# Patient Record
Sex: MALE | Race: WHITE | Employment: FULL TIME | URBAN - NONMETROPOLITAN AREA
[De-identification: names, ages, dates, MRNs, and addresses within clinical notes are randomized per-mention and may not be internally consistent; named-entity substitution may affect disease eponyms.]

---

## 2021-01-01 ENCOUNTER — APPOINTMENT (OUTPATIENT)
Dept: GENERAL RADIOLOGY | Age: 51
DRG: 870 | End: 2021-01-01
Attending: INTERNAL MEDICINE
Payer: COMMERCIAL

## 2021-01-01 ENCOUNTER — APPOINTMENT (OUTPATIENT)
Dept: CARDIAC CATH/INVASIVE PROCEDURES | Age: 51
DRG: 870 | End: 2021-01-01
Attending: INTERNAL MEDICINE
Payer: COMMERCIAL

## 2021-01-01 ENCOUNTER — HOSPITAL ENCOUNTER (INPATIENT)
Age: 51
LOS: 13 days | DRG: 870 | End: 2022-01-03
Attending: INTERNAL MEDICINE | Admitting: PEDIATRICS
Payer: COMMERCIAL

## 2021-01-01 LAB
ACINETOBACTER CALCOACETICUS-BAUMANNII BY PCR: NOT DETECTED
ADENOVIRUS BY PCR: NOT DETECTED
ALBUMIN SERPL-MCNC: 3.2 G/DL (ref 3.5–5.1)
ALLEN TEST: ABNORMAL
ALP BLD-CCNC: 78 U/L (ref 38–126)
ALT SERPL-CCNC: 65 U/L (ref 11–66)
ANION GAP SERPL CALCULATED.3IONS-SCNC: 10 MEQ/L (ref 8–16)
ANION GAP SERPL CALCULATED.3IONS-SCNC: 10 MEQ/L (ref 8–16)
ANION GAP SERPL CALCULATED.3IONS-SCNC: 13 MEQ/L (ref 8–16)
ANION GAP SERPL CALCULATED.3IONS-SCNC: 15 MEQ/L (ref 8–16)
ANION GAP SERPL CALCULATED.3IONS-SCNC: 15 MEQ/L (ref 8–16)
ANION GAP SERPL CALCULATED.3IONS-SCNC: 16 MEQ/L (ref 8–16)
ANION GAP SERPL CALCULATED.3IONS-SCNC: 9 MEQ/L (ref 8–16)
APTT: 72.8 SECONDS (ref 22–38)
APTT: 88.5 SECONDS (ref 22–38)
AST SERPL-CCNC: 73 U/L (ref 5–40)
AVERAGE GLUCOSE: 114 MG/DL (ref 70–126)
BACTERIA: ABNORMAL /HPF
BASE EXCESS (CALCULATED): -0.1 MMOL/L (ref -2.5–2.5)
BASE EXCESS (CALCULATED): -1 MMOL/L (ref -2.5–2.5)
BASE EXCESS (CALCULATED): 0.8 MMOL/L (ref -2.5–2.5)
BASE EXCESS MIXED: -3.6 MMOL/L (ref -2–3)
BASOPHILS # BLD: 0.1 %
BASOPHILS ABSOLUTE: 0 THOU/MM3 (ref 0–0.1)
BILIRUB SERPL-MCNC: 1.1 MG/DL (ref 0.3–1.2)
BILIRUBIN URINE: NEGATIVE
BLOOD, URINE: ABNORMAL
BUN BLDV-MCNC: 18 MG/DL (ref 7–22)
BUN BLDV-MCNC: 20 MG/DL (ref 7–22)
BUN BLDV-MCNC: 20 MG/DL (ref 7–22)
BUN BLDV-MCNC: 21 MG/DL (ref 7–22)
BUN BLDV-MCNC: 22 MG/DL (ref 7–22)
BUN BLDV-MCNC: 22 MG/DL (ref 7–22)
BUN BLDV-MCNC: 23 MG/DL (ref 7–22)
BUN BLDV-MCNC: 23 MG/DL (ref 7–22)
BUN BLDV-MCNC: 26 MG/DL (ref 7–22)
BUN BLDV-MCNC: 29 MG/DL (ref 7–22)
BUN BLDV-MCNC: 32 MG/DL (ref 7–22)
BUN BLDV-MCNC: 33 MG/DL (ref 7–22)
C-REACTIVE PROTEIN: 0.88 MG/DL (ref 0–1)
C-REACTIVE PROTEIN: 1.59 MG/DL (ref 0–1)
C-REACTIVE PROTEIN: 6.63 MG/DL (ref 0–1)
CALCIUM SERPL-MCNC: 7.9 MG/DL (ref 8.5–10.5)
CALCIUM SERPL-MCNC: 8 MG/DL (ref 8.5–10.5)
CALCIUM SERPL-MCNC: 8.1 MG/DL (ref 8.5–10.5)
CALCIUM SERPL-MCNC: 8.2 MG/DL (ref 8.5–10.5)
CALCIUM SERPL-MCNC: 8.3 MG/DL (ref 8.5–10.5)
CALCIUM SERPL-MCNC: 8.3 MG/DL (ref 8.5–10.5)
CALCIUM SERPL-MCNC: 8.5 MG/DL (ref 8.5–10.5)
CALCIUM SERPL-MCNC: 8.8 MG/DL (ref 8.5–10.5)
CASTS 2: ABNORMAL /LPF
CASTS UA: ABNORMAL /LPF
CHARACTER, URINE: ABNORMAL
CHLAMYDIA PNEUMONIAE BY PCR: NOT DETECTED
CHLORIDE BLD-SCNC: 100 MEQ/L (ref 98–111)
CHLORIDE BLD-SCNC: 101 MEQ/L (ref 98–111)
CHLORIDE BLD-SCNC: 101 MEQ/L (ref 98–111)
CHLORIDE BLD-SCNC: 102 MEQ/L (ref 98–111)
CHLORIDE BLD-SCNC: 103 MEQ/L (ref 98–111)
CHLORIDE BLD-SCNC: 95 MEQ/L (ref 98–111)
CHLORIDE BLD-SCNC: 98 MEQ/L (ref 98–111)
CHLORIDE BLD-SCNC: 98 MEQ/L (ref 98–111)
CHLORIDE BLD-SCNC: 99 MEQ/L (ref 98–111)
CHOLESTEROL, TOTAL: 74 MG/DL (ref 100–199)
CO2: 19 MEQ/L (ref 23–33)
CO2: 20 MEQ/L (ref 23–33)
CO2: 21 MEQ/L (ref 23–33)
CO2: 22 MEQ/L (ref 23–33)
CO2: 22 MEQ/L (ref 23–33)
CO2: 25 MEQ/L (ref 23–33)
CO2: 26 MEQ/L (ref 23–33)
COLLECTED BY:: ABNORMAL
COLOR: YELLOW
CORTISOL: 21.36 UG/DL
CREAT SERPL-MCNC: 0.5 MG/DL (ref 0.4–1.2)
CREAT SERPL-MCNC: 0.6 MG/DL (ref 0.4–1.2)
CREAT SERPL-MCNC: 0.7 MG/DL (ref 0.4–1.2)
CREAT SERPL-MCNC: 0.8 MG/DL (ref 0.4–1.2)
CREAT SERPL-MCNC: 0.8 MG/DL (ref 0.4–1.2)
CRYSTALS, UA: ABNORMAL
D-DIMER QUANTITATIVE: 535 NG/ML FEU (ref 0–500)
DEVICE: ABNORMAL
EKG ATRIAL RATE: 55 BPM
EKG ATRIAL RATE: 58 BPM
EKG ATRIAL RATE: 72 BPM
EKG P AXIS: 58 DEGREES
EKG P AXIS: 62 DEGREES
EKG P AXIS: 67 DEGREES
EKG P-R INTERVAL: 138 MS
EKG P-R INTERVAL: 144 MS
EKG P-R INTERVAL: 148 MS
EKG Q-T INTERVAL: 416 MS
EKG Q-T INTERVAL: 446 MS
EKG Q-T INTERVAL: 500 MS
EKG QRS DURATION: 118 MS
EKG QRS DURATION: 120 MS
EKG QRS DURATION: 126 MS
EKG QTC CALCULATION (BAZETT): 426 MS
EKG QTC CALCULATION (BAZETT): 455 MS
EKG QTC CALCULATION (BAZETT): 490 MS
EKG R AXIS: -5 DEGREES
EKG R AXIS: 17 DEGREES
EKG R AXIS: 51 DEGREES
EKG T AXIS: 63 DEGREES
EKG T AXIS: 66 DEGREES
EKG T AXIS: 73 DEGREES
EKG VENTRICULAR RATE: 55 BPM
EKG VENTRICULAR RATE: 58 BPM
EKG VENTRICULAR RATE: 72 BPM
ENTEROBACTER CLOACAE COMPLEX BY PCR: NOT DETECTED
EOSINOPHIL # BLD: 0 %
EOSINOPHIL # BLD: 0.1 %
EOSINOPHIL # BLD: 0.1 %
EOSINOPHILS ABSOLUTE: 0 THOU/MM3 (ref 0–0.4)
EPITHELIAL CELLS, UA: ABNORMAL /HPF
ERYTHROCYTE [DISTWIDTH] IN BLOOD BY AUTOMATED COUNT: 11.1 % (ref 11.5–14.5)
ERYTHROCYTE [DISTWIDTH] IN BLOOD BY AUTOMATED COUNT: 11.4 % (ref 11.5–14.5)
ERYTHROCYTE [DISTWIDTH] IN BLOOD BY AUTOMATED COUNT: 11.5 % (ref 11.5–14.5)
ERYTHROCYTE [DISTWIDTH] IN BLOOD BY AUTOMATED COUNT: 11.5 % (ref 11.5–14.5)
ERYTHROCYTE [DISTWIDTH] IN BLOOD BY AUTOMATED COUNT: 11.6 % (ref 11.5–14.5)
ERYTHROCYTE [DISTWIDTH] IN BLOOD BY AUTOMATED COUNT: 37.5 FL (ref 35–45)
ERYTHROCYTE [DISTWIDTH] IN BLOOD BY AUTOMATED COUNT: 37.7 FL (ref 35–45)
ERYTHROCYTE [DISTWIDTH] IN BLOOD BY AUTOMATED COUNT: 38.4 FL (ref 35–45)
ERYTHROCYTE [DISTWIDTH] IN BLOOD BY AUTOMATED COUNT: 38.5 FL (ref 35–45)
ERYTHROCYTE [DISTWIDTH] IN BLOOD BY AUTOMATED COUNT: 38.5 FL (ref 35–45)
ERYTHROCYTE [DISTWIDTH] IN BLOOD BY AUTOMATED COUNT: 38.7 FL (ref 35–45)
ERYTHROCYTE [DISTWIDTH] IN BLOOD BY AUTOMATED COUNT: 38.7 FL (ref 35–45)
ERYTHROCYTE [DISTWIDTH] IN BLOOD BY AUTOMATED COUNT: 39.1 FL (ref 35–45)
ESCHERICHIA COLI BY PCR: NOT DETECTED
FERRITIN: 2420 NG/ML (ref 22–322)
FERRITIN: 2564 NG/ML (ref 22–322)
FIO2, MIXED VENOUS: 100
GFR SERPL CREATININE-BSD FRML MDRD: > 90 ML/MIN/1.73M2
GLUCOSE BLD-MCNC: 106 MG/DL (ref 70–108)
GLUCOSE BLD-MCNC: 111 MG/DL (ref 70–108)
GLUCOSE BLD-MCNC: 116 MG/DL (ref 70–108)
GLUCOSE BLD-MCNC: 119 MG/DL (ref 70–108)
GLUCOSE BLD-MCNC: 120 MG/DL (ref 70–108)
GLUCOSE BLD-MCNC: 123 MG/DL (ref 70–108)
GLUCOSE BLD-MCNC: 128 MG/DL (ref 70–108)
GLUCOSE BLD-MCNC: 129 MG/DL (ref 70–108)
GLUCOSE BLD-MCNC: 131 MG/DL (ref 70–108)
GLUCOSE BLD-MCNC: 134 MG/DL (ref 70–108)
GLUCOSE BLD-MCNC: 135 MG/DL (ref 70–108)
GLUCOSE BLD-MCNC: 138 MG/DL (ref 70–108)
GLUCOSE BLD-MCNC: 142 MG/DL (ref 70–108)
GLUCOSE BLD-MCNC: 144 MG/DL (ref 70–108)
GLUCOSE BLD-MCNC: 146 MG/DL (ref 70–108)
GLUCOSE BLD-MCNC: 153 MG/DL (ref 70–108)
GLUCOSE BLD-MCNC: 157 MG/DL (ref 70–108)
GLUCOSE BLD-MCNC: 166 MG/DL (ref 70–108)
GLUCOSE BLD-MCNC: 181 MG/DL (ref 70–108)
GLUCOSE BLD-MCNC: 90 MG/DL (ref 70–108)
GLUCOSE URINE: NEGATIVE MG/DL
HAEMOPHILUS INFLUENZAE BY PCR: NOT DETECTED
HBA1C MFR BLD: 5.8 % (ref 4.4–6.4)
HCO3, MIXED: 29 MMOL/L (ref 23–28)
HCO3: 25 MMOL/L (ref 23–28)
HCO3: 26 MMOL/L (ref 23–28)
HCO3: 28 MMOL/L (ref 23–28)
HCT VFR BLD CALC: 40.5 % (ref 42–52)
HCT VFR BLD CALC: 42.2 % (ref 42–52)
HCT VFR BLD CALC: 42.7 % (ref 42–52)
HCT VFR BLD CALC: 44.2 % (ref 42–52)
HCT VFR BLD CALC: 44.3 % (ref 42–52)
HCT VFR BLD CALC: 44.7 % (ref 42–52)
HCT VFR BLD CALC: 45.3 % (ref 42–52)
HCT VFR BLD CALC: 45.9 % (ref 42–52)
HDLC SERPL-MCNC: 39 MG/DL
HEMOGLOBIN: 13.8 GM/DL (ref 14–18)
HEMOGLOBIN: 14.7 GM/DL (ref 14–18)
HEMOGLOBIN: 14.8 GM/DL (ref 14–18)
HEMOGLOBIN: 14.9 GM/DL (ref 14–18)
HEMOGLOBIN: 15.3 GM/DL (ref 14–18)
HEMOGLOBIN: 15.3 GM/DL (ref 14–18)
HEMOGLOBIN: 15.5 GM/DL (ref 14–18)
HEMOGLOBIN: 15.7 GM/DL (ref 14–18)
HEPARIN UNFRACTIONATED: 0.56 U/ML (ref 0.3–0.7)
HEPARIN UNFRACTIONATED: 0.61 U/ML (ref 0.3–0.7)
HEPARIN UNFRACTIONATED: 0.8 U/ML (ref 0.3–0.7)
IFIO2: 100
IMMATURE GRANS (ABS): 0.19 THOU/MM3 (ref 0–0.07)
IMMATURE GRANS (ABS): 0.26 THOU/MM3 (ref 0–0.07)
IMMATURE GRANS (ABS): 0.27 THOU/MM3 (ref 0–0.07)
IMMATURE GRANULOCYTES: 1.1 %
IMMATURE GRANULOCYTES: 1.3 %
IMMATURE GRANULOCYTES: 1.7 %
INFLUENZA A BY PCR: NOT DETECTED
INFLUENZA B BY PCR: NOT DETECTED
KETONES, URINE: NEGATIVE
KLEBSIELLA AEROGENES BY PCR: NOT DETECTED
KLEBSIELLA OXYTOCA BY PCR: NOT DETECTED
KLEBSIELLA PNEUMONIAE GROUP BY PCR: NOT DETECTED
LACTIC ACID, SEPSIS: 2.3 MMOL/L (ref 0.5–1.9)
LD: 340 U/L (ref 100–190)
LD: 347 U/L (ref 100–190)
LDL CHOLESTEROL CALCULATED: 21 MG/DL
LEGIONELLA PNEUMOPHILIA BY PCR: NOT DETECTED
LEUKOCYTE ESTERASE, URINE: NEGATIVE
LV EF: 65 %
LVEF MODALITY: NORMAL
LYMPHOCYTES # BLD: 2.2 %
LYMPHOCYTES # BLD: 2.8 %
LYMPHOCYTES # BLD: 3.2 %
LYMPHOCYTES ABSOLUTE: 0.5 THOU/MM3 (ref 1–4.8)
MAGNESIUM: 2.6 MG/DL (ref 1.6–2.4)
MCH RBC QN AUTO: 30.8 PG (ref 26–33)
MCH RBC QN AUTO: 31.2 PG (ref 26–33)
MCH RBC QN AUTO: 31.6 PG (ref 26–33)
MCH RBC QN AUTO: 31.7 PG (ref 26–33)
MCH RBC QN AUTO: 31.9 PG (ref 26–33)
MCH RBC QN AUTO: 32 PG (ref 26–33)
MCH RBC QN AUTO: 32 PG (ref 26–33)
MCH RBC QN AUTO: 32.1 PG (ref 26–33)
MCHC RBC AUTO-ENTMCNC: 33.4 GM/DL (ref 32.2–35.5)
MCHC RBC AUTO-ENTMCNC: 34.1 GM/DL (ref 32.2–35.5)
MCHC RBC AUTO-ENTMCNC: 34.2 GM/DL (ref 32.2–35.5)
MCHC RBC AUTO-ENTMCNC: 34.6 GM/DL (ref 32.2–35.5)
MCHC RBC AUTO-ENTMCNC: 34.8 GM/DL (ref 32.2–35.5)
MCHC RBC AUTO-ENTMCNC: 34.9 GM/DL (ref 32.2–35.5)
MCV RBC AUTO: 91 FL (ref 80–94)
MCV RBC AUTO: 91.6 FL (ref 80–94)
MCV RBC AUTO: 91.7 FL (ref 80–94)
MCV RBC AUTO: 92.1 FL (ref 80–94)
MCV RBC AUTO: 92.3 FL (ref 80–94)
MCV RBC AUTO: 92.3 FL (ref 80–94)
MCV RBC AUTO: 93.5 FL (ref 80–94)
MCV RBC AUTO: 93.5 FL (ref 80–94)
METAPNEUMOVIRUS BY PCR: NOT DETECTED
MISCELLANEOUS 2: ABNORMAL
MODE: ABNORMAL
MONOCYTES # BLD: 3.4 %
MONOCYTES # BLD: 5.1 %
MONOCYTES # BLD: 5.8 %
MONOCYTES ABSOLUTE: 0.7 THOU/MM3 (ref 0.4–1.3)
MONOCYTES ABSOLUTE: 0.8 THOU/MM3 (ref 0.4–1.3)
MONOCYTES ABSOLUTE: 1 THOU/MM3 (ref 0.4–1.3)
MORAXELLA CATARRHALIS BY PCR: NOT DETECTED
MYCOPLASMA PNEUMONIAE BY PCR: NOT DETECTED
NITRITE, URINE: NEGATIVE
NON-SARS CORONAVIRUS: NOT DETECTED
NUCLEATED RED BLOOD CELLS: 0 /100 WBC
O2 SAT, MIXED: 84 %
O2 SATURATION: 89 %
O2 SATURATION: 90 %
O2 SATURATION: 96 %
OSMOLALITY URINE: 1068 MOSMOL/KG (ref 250–750)
OSMOLALITY: 289 MOSMOL/KG (ref 275–295)
OSMOLALITY: 291 MOSMOL/KG (ref 275–295)
PARAINFLUENZA VIRUS BY PCR: NOT DETECTED
PCO2, MIXED VENOUS: 84 MMHG (ref 41–51)
PCO2: 47 MMHG (ref 35–45)
PCO2: 47 MMHG (ref 35–45)
PCO2: 56 MMHG (ref 35–45)
PH BLOOD GAS: 7.31 (ref 7.35–7.45)
PH BLOOD GAS: 7.34 (ref 7.35–7.45)
PH BLOOD GAS: 7.35 (ref 7.35–7.45)
PH UA: 5.5 (ref 5–9)
PH, MIXED: 7.14 (ref 7.31–7.41)
PIP: 36 CMH2O
PIP: 38 CMH2O
PLATELET # BLD: 259 THOU/MM3 (ref 130–400)
PLATELET # BLD: 264 THOU/MM3 (ref 130–400)
PLATELET # BLD: 296 THOU/MM3 (ref 130–400)
PLATELET # BLD: 339 THOU/MM3 (ref 130–400)
PLATELET # BLD: 347 THOU/MM3 (ref 130–400)
PLATELET # BLD: 348 THOU/MM3 (ref 130–400)
PLATELET # BLD: 380 THOU/MM3 (ref 130–400)
PLATELET # BLD: 385 THOU/MM3 (ref 130–400)
PMV BLD AUTO: 10 FL (ref 9.4–12.4)
PMV BLD AUTO: 10.1 FL (ref 9.4–12.4)
PMV BLD AUTO: 9.2 FL (ref 9.4–12.4)
PMV BLD AUTO: 9.3 FL (ref 9.4–12.4)
PMV BLD AUTO: 9.3 FL (ref 9.4–12.4)
PMV BLD AUTO: 9.5 FL (ref 9.4–12.4)
PMV BLD AUTO: 9.8 FL (ref 9.4–12.4)
PMV BLD AUTO: 9.9 FL (ref 9.4–12.4)
PO2 MIXED: 65 MMHG (ref 25–40)
PO2: 63 MMHG (ref 71–104)
PO2: 63 MMHG (ref 71–104)
PO2: 83 MMHG (ref 71–104)
POTASSIUM SERPL-SCNC: 4.8 MEQ/L (ref 3.5–5.2)
POTASSIUM SERPL-SCNC: 4.8 MEQ/L (ref 3.5–5.2)
POTASSIUM SERPL-SCNC: 4.9 MEQ/L (ref 3.5–5.2)
POTASSIUM SERPL-SCNC: 5 MEQ/L (ref 3.5–5.2)
POTASSIUM SERPL-SCNC: 5.3 MEQ/L (ref 3.5–5.2)
POTASSIUM SERPL-SCNC: 5.3 MEQ/L (ref 3.5–5.2)
POTASSIUM SERPL-SCNC: 5.4 MEQ/L (ref 3.5–5.2)
POTASSIUM SERPL-SCNC: 5.4 MEQ/L (ref 3.5–5.2)
PRO-BNP: 135.7 PG/ML (ref 0–900)
PROCALCITONIN: 0.07 NG/ML (ref 0.01–0.09)
PROCALCITONIN: 0.2 NG/ML (ref 0.01–0.09)
PROTEIN UA: 30
PROTEUS SPECIES BY PCR: NOT DETECTED
PSEUDOMONAS AERUGINOSA BY PCR: NOT DETECTED
RBC # BLD: 4.33 MILL/MM3 (ref 4.7–6.1)
RBC # BLD: 4.58 MILL/MM3 (ref 4.7–6.1)
RBC # BLD: 4.66 MILL/MM3 (ref 4.7–6.1)
RBC # BLD: 4.8 MILL/MM3 (ref 4.7–6.1)
RBC # BLD: 4.82 MILL/MM3 (ref 4.7–6.1)
RBC # BLD: 4.91 MILL/MM3 (ref 4.7–6.1)
RBC URINE: ABNORMAL /HPF
RENAL EPITHELIAL, UA: ABNORMAL
RESISTANT GENE CTX-M BY PCR: ABNORMAL
RESISTANT GENE IMP BY PCR: ABNORMAL
RESISTANT GENE KPC BY PCR: ABNORMAL
RESISTANT GENE MECA/C & MREJ BY PCR: NOT DETECTED
RESISTANT GENE NDM BY PCR: ABNORMAL
RESISTANT GENE OXA-48-LIKE BY PCR: ABNORMAL
RESISTANT GENE VIM BY PCR: ABNORMAL
RESPIRATORY SYNCYTIAL VIRUS BY PCR: NOT DETECTED
RHINOVIRUS ENTEROVIRUS PCR: NOT DETECTED
SARS-COV-2: DETECTED
SEG NEUTROPHILS: 89.8 %
SEG NEUTROPHILS: 90.1 %
SEG NEUTROPHILS: 93 %
SEGMENTED NEUTROPHILS ABSOLUTE COUNT: 13.9 THOU/MM3 (ref 1.8–7.7)
SEGMENTED NEUTROPHILS ABSOLUTE COUNT: 15.9 THOU/MM3 (ref 1.8–7.7)
SEGMENTED NEUTROPHILS ABSOLUTE COUNT: 19.3 THOU/MM3 (ref 1.8–7.7)
SERRATIA MARCESCENS BY PCR: NOT DETECTED
SET PEEP: 16 MMHG
SET PEEP: 18 MMHG
SET PRESS SUPP: 20 CMH2O
SET PRESS SUPP: 22 CMH2O
SET RESPIRATORY RATE: 20 BPM
SET RESPIRATORY RATE: 30 BPM
SITE: ABNORMAL
SODIUM BLD-SCNC: 130 MEQ/L (ref 135–145)
SODIUM BLD-SCNC: 131 MEQ/L (ref 135–145)
SODIUM BLD-SCNC: 132 MEQ/L (ref 135–145)
SODIUM BLD-SCNC: 133 MEQ/L (ref 135–145)
SODIUM BLD-SCNC: 134 MEQ/L (ref 135–145)
SODIUM BLD-SCNC: 136 MEQ/L (ref 135–145)
SODIUM BLD-SCNC: 138 MEQ/L (ref 135–145)
SODIUM URINE: 35 MEQ/L
SOURCE, BLOOD GAS: ABNORMAL
SOURCE: ABNORMAL
SPECIFIC GRAVITY, URINE: > 1.03 (ref 1–1.03)
SPECIMEN ACCEPTABILITY: ABNORMAL
STAPH AUREUS BY PCR: DETECTED
STREP AGALACTIAE BY PCR: NOT DETECTED
STREP PNEUMONIAE BY PCR: NOT DETECTED
STREP PYOGENES BY PCR: NOT DETECTED
TOTAL PROTEIN: 6.4 G/DL (ref 6.1–8)
TRIGL SERPL-MCNC: 245 MG/DL (ref 0–199)
TRIGL SERPL-MCNC: 71 MG/DL (ref 0–199)
TROPONIN T: 0.1 NG/ML
TROPONIN T: 0.29 NG/ML
TROPONIN T: 0.32 NG/ML
TSH SERPL DL<=0.05 MIU/L-ACNC: 0.65 UIU/ML (ref 0.4–4.2)
UROBILINOGEN, URINE: 1 EU/DL (ref 0–1)
WBC # BLD: 10.5 THOU/MM3 (ref 4.8–10.8)
WBC # BLD: 10.6 THOU/MM3 (ref 4.8–10.8)
WBC # BLD: 11.5 THOU/MM3 (ref 4.8–10.8)
WBC # BLD: 14.2 THOU/MM3 (ref 4.8–10.8)
WBC # BLD: 15.5 THOU/MM3 (ref 4.8–10.8)
WBC # BLD: 17.7 THOU/MM3 (ref 4.8–10.8)
WBC # BLD: 20.7 THOU/MM3 (ref 4.8–10.8)
WBC # BLD: 9.5 THOU/MM3 (ref 4.8–10.8)
WBC UA: ABNORMAL /HPF
YEAST: ABNORMAL

## 2021-01-01 PROCEDURE — 87798 DETECT AGENT NOS DNA AMP: CPT

## 2021-01-01 PROCEDURE — 94669 MECHANICAL CHEST WALL OSCILL: CPT

## 2021-01-01 PROCEDURE — 6360000002 HC RX W HCPCS: Performed by: NURSE PRACTITIONER

## 2021-01-01 PROCEDURE — 99232 SBSQ HOSP IP/OBS MODERATE 35: CPT | Performed by: NURSE PRACTITIONER

## 2021-01-01 PROCEDURE — 6370000000 HC RX 637 (ALT 250 FOR IP): Performed by: FAMILY MEDICINE

## 2021-01-01 PROCEDURE — 85025 COMPLETE CBC W/AUTO DIFF WBC: CPT

## 2021-01-01 PROCEDURE — 2140000000 HC CCU INTERMEDIATE R&B

## 2021-01-01 PROCEDURE — 6370000000 HC RX 637 (ALT 250 FOR IP): Performed by: INTERNAL MEDICINE

## 2021-01-01 PROCEDURE — 82803 BLOOD GASES ANY COMBINATION: CPT

## 2021-01-01 PROCEDURE — 71045 X-RAY EXAM CHEST 1 VIEW: CPT

## 2021-01-01 PROCEDURE — 87070 CULTURE OTHR SPECIMN AEROBIC: CPT

## 2021-01-01 PROCEDURE — 87581 M.PNEUMON DNA AMP PROBE: CPT

## 2021-01-01 PROCEDURE — 2700000000 HC OXYGEN THERAPY PER DAY

## 2021-01-01 PROCEDURE — 82948 REAGENT STRIP/BLOOD GLUCOSE: CPT

## 2021-01-01 PROCEDURE — 2580000003 HC RX 258: Performed by: NURSE PRACTITIONER

## 2021-01-01 PROCEDURE — 6360000002 HC RX W HCPCS: Performed by: FAMILY MEDICINE

## 2021-01-01 PROCEDURE — 6370000000 HC RX 637 (ALT 250 FOR IP): Performed by: PEDIATRICS

## 2021-01-01 PROCEDURE — 2580000003 HC RX 258: Performed by: INTERNAL MEDICINE

## 2021-01-01 PROCEDURE — 83605 ASSAY OF LACTIC ACID: CPT

## 2021-01-01 PROCEDURE — 99291 CRITICAL CARE FIRST HOUR: CPT | Performed by: INTERNAL MEDICINE

## 2021-01-01 PROCEDURE — 2580000003 HC RX 258: Performed by: FAMILY MEDICINE

## 2021-01-01 PROCEDURE — 83930 ASSAY OF BLOOD OSMOLALITY: CPT

## 2021-01-01 PROCEDURE — 6370000000 HC RX 637 (ALT 250 FOR IP): Performed by: NURSE PRACTITIONER

## 2021-01-01 PROCEDURE — 6360000002 HC RX W HCPCS

## 2021-01-01 PROCEDURE — 94660 CPAP INITIATION&MGMT: CPT

## 2021-01-01 PROCEDURE — 85027 COMPLETE CBC AUTOMATED: CPT

## 2021-01-01 PROCEDURE — 93010 ELECTROCARDIOGRAM REPORT: CPT | Performed by: INTERNAL MEDICINE

## 2021-01-01 PROCEDURE — 36592 COLLECT BLOOD FROM PICC: CPT

## 2021-01-01 PROCEDURE — 80048 BASIC METABOLIC PNL TOTAL CA: CPT

## 2021-01-01 PROCEDURE — APPNB60 APP NON BILLABLE TIME 46-60 MINS: Performed by: NURSE PRACTITIONER

## 2021-01-01 PROCEDURE — 2500000003 HC RX 250 WO HCPCS: Performed by: NURSE PRACTITIONER

## 2021-01-01 PROCEDURE — 94761 N-INVAS EAR/PLS OXIMETRY MLT: CPT

## 2021-01-01 PROCEDURE — 2100000000 HC CCU R&B

## 2021-01-01 PROCEDURE — 82728 ASSAY OF FERRITIN: CPT

## 2021-01-01 PROCEDURE — 84484 ASSAY OF TROPONIN QUANT: CPT

## 2021-01-01 PROCEDURE — 93005 ELECTROCARDIOGRAM TRACING: CPT | Performed by: NURSE PRACTITIONER

## 2021-01-01 PROCEDURE — 99232 SBSQ HOSP IP/OBS MODERATE 35: CPT | Performed by: FAMILY MEDICINE

## 2021-01-01 PROCEDURE — 93306 TTE W/DOPPLER COMPLETE: CPT

## 2021-01-01 PROCEDURE — 93005 ELECTROCARDIOGRAM TRACING: CPT | Performed by: PEDIATRICS

## 2021-01-01 PROCEDURE — 84478 ASSAY OF TRIGLYCERIDES: CPT

## 2021-01-01 PROCEDURE — 86140 C-REACTIVE PROTEIN: CPT

## 2021-01-01 PROCEDURE — 0BH18EZ INSERTION OF ENDOTRACHEAL AIRWAY INTO TRACHEA, VIA NATURAL OR ARTIFICIAL OPENING ENDOSCOPIC: ICD-10-PCS | Performed by: INTERNAL MEDICINE

## 2021-01-01 PROCEDURE — 85730 THROMBOPLASTIN TIME PARTIAL: CPT

## 2021-01-01 PROCEDURE — 81001 URINALYSIS AUTO W/SCOPE: CPT

## 2021-01-01 PROCEDURE — 87040 BLOOD CULTURE FOR BACTERIA: CPT

## 2021-01-01 PROCEDURE — 2500000003 HC RX 250 WO HCPCS: Performed by: INTERNAL MEDICINE

## 2021-01-01 PROCEDURE — 36600 WITHDRAWAL OF ARTERIAL BLOOD: CPT

## 2021-01-01 PROCEDURE — 83036 HEMOGLOBIN GLYCOSYLATED A1C: CPT

## 2021-01-01 PROCEDURE — 87205 SMEAR GRAM STAIN: CPT

## 2021-01-01 PROCEDURE — 99232 SBSQ HOSP IP/OBS MODERATE 35: CPT | Performed by: STUDENT IN AN ORGANIZED HEALTH CARE EDUCATION/TRAINING PROGRAM

## 2021-01-01 PROCEDURE — 36415 COLL VENOUS BLD VENIPUNCTURE: CPT

## 2021-01-01 PROCEDURE — 80061 LIPID PANEL: CPT

## 2021-01-01 PROCEDURE — 84145 PROCALCITONIN (PCT): CPT

## 2021-01-01 PROCEDURE — 87486 CHLMYD PNEUM DNA AMP PROBE: CPT

## 2021-01-01 PROCEDURE — 6360000002 HC RX W HCPCS: Performed by: INTERNAL MEDICINE

## 2021-01-01 PROCEDURE — 83880 ASSAY OF NATRIURETIC PEPTIDE: CPT

## 2021-01-01 PROCEDURE — 84300 ASSAY OF URINE SODIUM: CPT

## 2021-01-01 PROCEDURE — 87541 LEGION PNEUMO DNA AMP PROB: CPT

## 2021-01-01 PROCEDURE — 85379 FIBRIN DEGRADATION QUANT: CPT

## 2021-01-01 PROCEDURE — 87631 RESP VIRUS 3-5 TARGETS: CPT

## 2021-01-01 PROCEDURE — 99223 1ST HOSP IP/OBS HIGH 75: CPT | Performed by: PEDIATRICS

## 2021-01-01 PROCEDURE — 99233 SBSQ HOSP IP/OBS HIGH 50: CPT | Performed by: HOSPITALIST

## 2021-01-01 PROCEDURE — 83735 ASSAY OF MAGNESIUM: CPT

## 2021-01-01 PROCEDURE — 83935 ASSAY OF URINE OSMOLALITY: CPT

## 2021-01-01 PROCEDURE — 87186 SC STD MICRODIL/AGAR DIL: CPT

## 2021-01-01 PROCEDURE — 87150 DNA/RNA AMPLIFIED PROBE: CPT

## 2021-01-01 PROCEDURE — 85520 HEPARIN ASSAY: CPT

## 2021-01-01 PROCEDURE — 36556 INSERT NON-TUNNEL CV CATH: CPT

## 2021-01-01 PROCEDURE — 82533 TOTAL CORTISOL: CPT

## 2021-01-01 PROCEDURE — 83615 LACTATE (LD) (LDH) ENZYME: CPT

## 2021-01-01 PROCEDURE — 02HV33Z INSERTION OF INFUSION DEVICE INTO SUPERIOR VENA CAVA, PERCUTANEOUS APPROACH: ICD-10-PCS | Performed by: INTERNAL MEDICINE

## 2021-01-01 PROCEDURE — 94003 VENT MGMT INPAT SUBQ DAY: CPT

## 2021-01-01 PROCEDURE — 99233 SBSQ HOSP IP/OBS HIGH 50: CPT | Performed by: FAMILY MEDICINE

## 2021-01-01 PROCEDURE — 5A1955Z RESPIRATORY VENTILATION, GREATER THAN 96 CONSECUTIVE HOURS: ICD-10-PCS | Performed by: INTERNAL MEDICINE

## 2021-01-01 PROCEDURE — 80053 COMPREHEN METABOLIC PANEL: CPT

## 2021-01-01 PROCEDURE — 87147 CULTURE TYPE IMMUNOLOGIC: CPT

## 2021-01-01 PROCEDURE — 87077 CULTURE AEROBIC IDENTIFY: CPT

## 2021-01-01 PROCEDURE — 36556 INSERT NON-TUNNEL CV CATH: CPT | Performed by: NURSE PRACTITIONER

## 2021-01-01 PROCEDURE — 84443 ASSAY THYROID STIM HORMONE: CPT

## 2021-01-01 PROCEDURE — 94002 VENT MGMT INPAT INIT DAY: CPT

## 2021-01-01 PROCEDURE — 31500 INSERT EMERGENCY AIRWAY: CPT | Performed by: NURSE PRACTITIONER

## 2021-01-01 PROCEDURE — 99223 1ST HOSP IP/OBS HIGH 75: CPT | Performed by: INTERNAL MEDICINE

## 2021-01-01 RX ORDER — ACETAMINOPHEN 325 MG/1
650 TABLET ORAL EVERY 6 HOURS PRN
Status: DISCONTINUED | OUTPATIENT
Start: 2021-01-01 | End: 2022-01-04 | Stop reason: HOSPADM

## 2021-01-01 RX ORDER — LOSARTAN POTASSIUM 50 MG/1
50 TABLET ORAL NIGHTLY
COMMUNITY

## 2021-01-01 RX ORDER — NOREPINEPHRINE BIT/0.9 % NACL 16MG/250ML
2-100 INFUSION BOTTLE (ML) INTRAVENOUS CONTINUOUS
Status: DISCONTINUED | OUTPATIENT
Start: 2021-01-01 | End: 2022-01-04 | Stop reason: HOSPADM

## 2021-01-01 RX ORDER — ALBUTEROL SULFATE 90 UG/1
2 AEROSOL, METERED RESPIRATORY (INHALATION) EVERY 4 HOURS PRN
Status: DISCONTINUED | OUTPATIENT
Start: 2021-01-01 | End: 2022-01-04 | Stop reason: HOSPADM

## 2021-01-01 RX ORDER — SODIUM CHLORIDE 0.9 % (FLUSH) 0.9 %
5-40 SYRINGE (ML) INJECTION EVERY 12 HOURS SCHEDULED
Status: DISCONTINUED | OUTPATIENT
Start: 2021-01-01 | End: 2022-01-04 | Stop reason: HOSPADM

## 2021-01-01 RX ORDER — DEXTROSE MONOHYDRATE 25 G/50ML
12.5 INJECTION, SOLUTION INTRAVENOUS PRN
Status: DISCONTINUED | OUTPATIENT
Start: 2021-01-01 | End: 2022-01-04 | Stop reason: HOSPADM

## 2021-01-01 RX ORDER — ZINC SULFATE 50(220)MG
50 CAPSULE ORAL DAILY
Status: DISCONTINUED | OUTPATIENT
Start: 2021-01-01 | End: 2021-01-01

## 2021-01-01 RX ORDER — SODIUM CHLORIDE 9 MG/ML
25 INJECTION, SOLUTION INTRAVENOUS PRN
Status: DISCONTINUED | OUTPATIENT
Start: 2021-01-01 | End: 2022-01-04 | Stop reason: HOSPADM

## 2021-01-01 RX ORDER — ONDANSETRON 4 MG/1
4 TABLET, ORALLY DISINTEGRATING ORAL EVERY 8 HOURS PRN
Status: DISCONTINUED | OUTPATIENT
Start: 2021-01-01 | End: 2022-01-04 | Stop reason: HOSPADM

## 2021-01-01 RX ORDER — ONDANSETRON 2 MG/ML
4 INJECTION INTRAMUSCULAR; INTRAVENOUS EVERY 6 HOURS PRN
Status: DISCONTINUED | OUTPATIENT
Start: 2021-01-01 | End: 2021-01-01

## 2021-01-01 RX ORDER — HEPARIN SODIUM 10000 [USP'U]/100ML
12 INJECTION, SOLUTION INTRAVENOUS CONTINUOUS
Status: DISCONTINUED | OUTPATIENT
Start: 2021-01-01 | End: 2021-01-01 | Stop reason: SDUPTHER

## 2021-01-01 RX ORDER — ASCORBIC ACID 500 MG
500 TABLET ORAL DAILY
Status: DISCONTINUED | OUTPATIENT
Start: 2021-01-01 | End: 2022-01-04 | Stop reason: HOSPADM

## 2021-01-01 RX ORDER — AMLODIPINE BESYLATE 10 MG/1
10 TABLET ORAL DAILY
COMMUNITY

## 2021-01-01 RX ORDER — PROPOFOL 10 MG/ML
5-50 INJECTION, EMULSION INTRAVENOUS
Status: DISCONTINUED | OUTPATIENT
Start: 2021-01-01 | End: 2022-01-04 | Stop reason: HOSPADM

## 2021-01-01 RX ORDER — ONDANSETRON 4 MG/1
4 TABLET, ORALLY DISINTEGRATING ORAL EVERY 8 HOURS PRN
Status: DISCONTINUED | OUTPATIENT
Start: 2021-01-01 | End: 2021-01-01

## 2021-01-01 RX ORDER — ACETAMINOPHEN 650 MG/1
650 SUPPOSITORY RECTAL EVERY 6 HOURS PRN
Status: DISCONTINUED | OUTPATIENT
Start: 2021-01-01 | End: 2021-01-01

## 2021-01-01 RX ORDER — LORAZEPAM 2 MG/ML
1 INJECTION INTRAMUSCULAR EVERY 6 HOURS PRN
Status: DISCONTINUED | OUTPATIENT
Start: 2021-01-01 | End: 2021-01-01 | Stop reason: SDUPTHER

## 2021-01-01 RX ORDER — DEXAMETHASONE SODIUM PHOSPHATE 10 MG/ML
10 INJECTION, EMULSION INTRAMUSCULAR; INTRAVENOUS EVERY 24 HOURS
Status: COMPLETED | OUTPATIENT
Start: 2021-01-01 | End: 2021-01-01

## 2021-01-01 RX ORDER — FENTANYL CITRATE 50 UG/ML
25 INJECTION, SOLUTION INTRAMUSCULAR; INTRAVENOUS
Status: DISCONTINUED | OUTPATIENT
Start: 2021-01-01 | End: 2022-01-04 | Stop reason: HOSPADM

## 2021-01-01 RX ORDER — MIDAZOLAM HYDROCHLORIDE 1 MG/ML
4 INJECTION INTRAMUSCULAR; INTRAVENOUS ONCE
Status: DISCONTINUED | OUTPATIENT
Start: 2021-01-01 | End: 2022-01-04 | Stop reason: HOSPADM

## 2021-01-01 RX ORDER — METOPROLOL TARTRATE 5 MG/5ML
2.5 INJECTION INTRAVENOUS ONCE
Status: COMPLETED | OUTPATIENT
Start: 2021-01-01 | End: 2021-01-01

## 2021-01-01 RX ORDER — HEPARIN SODIUM 1000 [USP'U]/ML
4000 INJECTION, SOLUTION INTRAVENOUS; SUBCUTANEOUS PRN
Status: DISCONTINUED | OUTPATIENT
Start: 2021-01-01 | End: 2021-01-01 | Stop reason: ALTCHOICE

## 2021-01-01 RX ORDER — POLYVINYL ALCOHOL 14 MG/ML
1 SOLUTION/ DROPS OPHTHALMIC PRN
Status: DISCONTINUED | OUTPATIENT
Start: 2021-01-01 | End: 2022-01-04 | Stop reason: HOSPADM

## 2021-01-01 RX ORDER — SENNOSIDES 8.8 MG/5ML
5 LIQUID ORAL NIGHTLY
Status: DISCONTINUED | OUTPATIENT
Start: 2021-01-01 | End: 2022-01-04 | Stop reason: HOSPADM

## 2021-01-01 RX ORDER — LACTOBACILLUS RHAMNOSUS GG 10B CELL
1 CAPSULE ORAL
Status: DISCONTINUED | OUTPATIENT
Start: 2021-01-01 | End: 2022-01-04 | Stop reason: HOSPADM

## 2021-01-01 RX ORDER — ACETAMINOPHEN 325 MG/1
650 TABLET ORAL EVERY 6 HOURS PRN
Status: DISCONTINUED | OUTPATIENT
Start: 2021-01-01 | End: 2021-01-01

## 2021-01-01 RX ORDER — SODIUM CHLORIDE 9 MG/ML
25 INJECTION, SOLUTION INTRAVENOUS PRN
Status: DISCONTINUED | OUTPATIENT
Start: 2021-01-01 | End: 2021-01-01

## 2021-01-01 RX ORDER — LEVOTHYROXINE SODIUM 0.05 MG/1
50 TABLET ORAL DAILY
Status: DISCONTINUED | OUTPATIENT
Start: 2021-01-01 | End: 2021-01-01

## 2021-01-01 RX ORDER — VITAMIN B COMPLEX
2000 TABLET ORAL DAILY
Status: DISCONTINUED | OUTPATIENT
Start: 2021-01-01 | End: 2021-01-01

## 2021-01-01 RX ORDER — SODIUM CHLORIDE 0.9 % (FLUSH) 0.9 %
5-40 SYRINGE (ML) INJECTION EVERY 12 HOURS SCHEDULED
Status: DISCONTINUED | OUTPATIENT
Start: 2021-01-01 | End: 2021-01-01

## 2021-01-01 RX ORDER — ASPIRIN 81 MG/1
81 TABLET, CHEWABLE ORAL DAILY
Status: DISCONTINUED | OUTPATIENT
Start: 2021-01-01 | End: 2021-01-01

## 2021-01-01 RX ORDER — MIDAZOLAM IN NACL,ISO-OSMOT/PF 50 MG/50ML
1-10 INFUSION BOTTLE (ML) INTRAVENOUS CONTINUOUS
Status: DISCONTINUED | OUTPATIENT
Start: 2021-01-01 | End: 2022-01-04 | Stop reason: HOSPADM

## 2021-01-01 RX ORDER — POLYETHYLENE GLYCOL 3350 17 G/17G
17 POWDER, FOR SOLUTION ORAL DAILY
Status: DISCONTINUED | OUTPATIENT
Start: 2021-01-01 | End: 2022-01-04 | Stop reason: HOSPADM

## 2021-01-01 RX ORDER — CLOPIDOGREL BISULFATE 75 MG/1
75 TABLET ORAL DAILY
Status: DISCONTINUED | OUTPATIENT
Start: 2021-01-01 | End: 2021-01-01

## 2021-01-01 RX ORDER — ASCORBIC ACID 500 MG
1000 TABLET ORAL DAILY
Status: DISCONTINUED | OUTPATIENT
Start: 2021-01-01 | End: 2021-01-01

## 2021-01-01 RX ORDER — POLYETHYLENE GLYCOL 3350 17 G/17G
17 POWDER, FOR SOLUTION ORAL DAILY PRN
Status: DISCONTINUED | OUTPATIENT
Start: 2021-01-01 | End: 2021-01-01

## 2021-01-01 RX ORDER — FENTANYL CITRATE 50 UG/ML
50 INJECTION, SOLUTION INTRAMUSCULAR; INTRAVENOUS ONCE
Status: COMPLETED | OUTPATIENT
Start: 2021-01-01 | End: 2021-01-01

## 2021-01-01 RX ORDER — HEPARIN SODIUM 1000 [USP'U]/ML
60 INJECTION, SOLUTION INTRAVENOUS; SUBCUTANEOUS ONCE
Status: DISCONTINUED | OUTPATIENT
Start: 2021-01-01 | End: 2021-01-01 | Stop reason: ALTCHOICE

## 2021-01-01 RX ORDER — SODIUM CHLORIDE 0.9 % (FLUSH) 0.9 %
5-40 SYRINGE (ML) INJECTION PRN
Status: DISCONTINUED | OUTPATIENT
Start: 2021-01-01 | End: 2022-01-04 | Stop reason: HOSPADM

## 2021-01-01 RX ORDER — ASPIRIN 81 MG/1
81 TABLET, CHEWABLE ORAL DAILY
Status: DISCONTINUED | OUTPATIENT
Start: 2021-01-01 | End: 2022-01-04 | Stop reason: HOSPADM

## 2021-01-01 RX ORDER — LEVOTHYROXINE SODIUM 0.05 MG/1
50 TABLET ORAL DAILY
Status: DISCONTINUED | OUTPATIENT
Start: 2021-01-01 | End: 2022-01-04 | Stop reason: HOSPADM

## 2021-01-01 RX ORDER — ATORVASTATIN CALCIUM 40 MG/1
40 TABLET, FILM COATED ORAL NIGHTLY
Status: DISCONTINUED | OUTPATIENT
Start: 2021-01-01 | End: 2021-01-01

## 2021-01-01 RX ORDER — ZINC SULFATE 50(220)MG
50 CAPSULE ORAL DAILY
Status: DISCONTINUED | OUTPATIENT
Start: 2021-01-01 | End: 2022-01-04 | Stop reason: HOSPADM

## 2021-01-01 RX ORDER — CHLORHEXIDINE GLUCONATE 0.12 MG/ML
15 RINSE ORAL 2 TIMES DAILY
Status: DISCONTINUED | OUTPATIENT
Start: 2021-01-01 | End: 2022-01-04 | Stop reason: HOSPADM

## 2021-01-01 RX ORDER — FENTANYL CITRATE 50 UG/ML
INJECTION, SOLUTION INTRAMUSCULAR; INTRAVENOUS
Status: COMPLETED
Start: 2021-01-01 | End: 2021-01-01

## 2021-01-01 RX ORDER — ONDANSETRON 2 MG/ML
4 INJECTION INTRAMUSCULAR; INTRAVENOUS EVERY 6 HOURS PRN
Status: DISCONTINUED | OUTPATIENT
Start: 2021-01-01 | End: 2022-01-04 | Stop reason: HOSPADM

## 2021-01-01 RX ORDER — LORAZEPAM 2 MG/ML
0.5 INJECTION INTRAMUSCULAR EVERY 6 HOURS PRN
Status: DISCONTINUED | OUTPATIENT
Start: 2021-01-01 | End: 2021-01-01

## 2021-01-01 RX ORDER — METOPROLOL TARTRATE 5 MG/5ML
2.5 INJECTION INTRAVENOUS ONCE
Status: COMPLETED | OUTPATIENT
Start: 2022-01-01 | End: 2022-01-01

## 2021-01-01 RX ORDER — LEVOTHYROXINE SODIUM 0.1 MG/1
100 TABLET ORAL DAILY
COMMUNITY

## 2021-01-01 RX ORDER — HEPARIN SODIUM 1000 [USP'U]/ML
2000 INJECTION, SOLUTION INTRAVENOUS; SUBCUTANEOUS PRN
Status: DISCONTINUED | OUTPATIENT
Start: 2021-01-01 | End: 2021-01-01 | Stop reason: ALTCHOICE

## 2021-01-01 RX ORDER — HEPARIN SODIUM 10000 [USP'U]/100ML
5-30 INJECTION, SOLUTION INTRAVENOUS CONTINUOUS
Status: DISCONTINUED | OUTPATIENT
Start: 2021-01-01 | End: 2021-01-01 | Stop reason: ALTCHOICE

## 2021-01-01 RX ORDER — NITROGLYCERIN 20 MG/100ML
0-200 INJECTION INTRAVENOUS CONTINUOUS
Status: DISCONTINUED | OUTPATIENT
Start: 2021-01-01 | End: 2021-01-01

## 2021-01-01 RX ORDER — NICOTINE POLACRILEX 4 MG
15 LOZENGE BUCCAL PRN
Status: DISCONTINUED | OUTPATIENT
Start: 2021-01-01 | End: 2022-01-04 | Stop reason: HOSPADM

## 2021-01-01 RX ORDER — ATORVASTATIN CALCIUM 40 MG/1
40 TABLET, FILM COATED ORAL NIGHTLY
Status: DISCONTINUED | OUTPATIENT
Start: 2021-01-01 | End: 2022-01-04 | Stop reason: HOSPADM

## 2021-01-01 RX ORDER — CLOPIDOGREL BISULFATE 75 MG/1
75 TABLET ORAL DAILY
Status: DISCONTINUED | OUTPATIENT
Start: 2021-01-01 | End: 2022-01-04 | Stop reason: HOSPADM

## 2021-01-01 RX ORDER — BENZONATATE 100 MG/1
200 CAPSULE ORAL 3 TIMES DAILY PRN
Status: DISCONTINUED | OUTPATIENT
Start: 2021-01-01 | End: 2021-01-01

## 2021-01-01 RX ORDER — VITAMIN B COMPLEX
1000 TABLET ORAL DAILY
Status: DISCONTINUED | OUTPATIENT
Start: 2021-01-01 | End: 2022-01-04 | Stop reason: HOSPADM

## 2021-01-01 RX ORDER — LOSARTAN POTASSIUM 25 MG/1
25 TABLET ORAL DAILY
Status: DISCONTINUED | OUTPATIENT
Start: 2021-01-01 | End: 2022-01-04 | Stop reason: HOSPADM

## 2021-01-01 RX ORDER — ACETAMINOPHEN 650 MG/1
650 SUPPOSITORY RECTAL EVERY 6 HOURS PRN
Status: DISCONTINUED | OUTPATIENT
Start: 2021-01-01 | End: 2022-01-04 | Stop reason: HOSPADM

## 2021-01-01 RX ORDER — CISATRACURIUM BESYLATE 2 MG/ML
10 INJECTION, SOLUTION INTRAVENOUS ONCE
Status: COMPLETED | OUTPATIENT
Start: 2021-01-01 | End: 2021-01-01

## 2021-01-01 RX ORDER — DEXTROSE MONOHYDRATE 50 MG/ML
100 INJECTION, SOLUTION INTRAVENOUS PRN
Status: DISCONTINUED | OUTPATIENT
Start: 2021-01-01 | End: 2022-01-04 | Stop reason: HOSPADM

## 2021-01-01 RX ADMIN — CLOPIDOGREL BISULFATE 75 MG: 75 TABLET, FILM COATED ORAL at 08:25

## 2021-01-01 RX ADMIN — DEXAMETHASONE SODIUM PHOSPHATE 20 MG: 4 INJECTION INTRA-ARTICULAR; INTRALESIONAL; INTRAMUSCULAR; INTRAVENOUS; SOFT TISSUE at 22:22

## 2021-01-01 RX ADMIN — NAFCILLIN SODIUM 1000 MG: 2 POWDER, FOR SOLUTION INTRAMUSCULAR; INTRAVENOUS at 03:26

## 2021-01-01 RX ADMIN — NAFCILLIN SODIUM 1000 MG: 2 POWDER, FOR SOLUTION INTRAMUSCULAR; INTRAVENOUS at 07:08

## 2021-01-01 RX ADMIN — METOPROLOL TARTRATE 12.5 MG: 25 TABLET, FILM COATED ORAL at 20:48

## 2021-01-01 RX ADMIN — FAMOTIDINE 20 MG: 10 INJECTION, SOLUTION INTRAVENOUS at 08:08

## 2021-01-01 RX ADMIN — LORAZEPAM 1 MG: 2 INJECTION INTRAMUSCULAR; INTRAVENOUS at 20:50

## 2021-01-01 RX ADMIN — SODIUM CHLORIDE, PRESERVATIVE FREE 10 ML: 5 INJECTION INTRAVENOUS at 08:05

## 2021-01-01 RX ADMIN — Medication 1 CAPSULE: at 08:26

## 2021-01-01 RX ADMIN — PROPOFOL 50 MCG/KG/MIN: 10 INJECTION, EMULSION INTRAVENOUS at 18:13

## 2021-01-01 RX ADMIN — Medication 50 MG: at 10:13

## 2021-01-01 RX ADMIN — ENOXAPARIN SODIUM 90 MG: 100 INJECTION SUBCUTANEOUS at 03:23

## 2021-01-01 RX ADMIN — SODIUM CHLORIDE, PRESERVATIVE FREE 10 ML: 5 INJECTION INTRAVENOUS at 21:38

## 2021-01-01 RX ADMIN — METOPROLOL TARTRATE 12.5 MG: 25 TABLET, FILM COATED ORAL at 18:46

## 2021-01-01 RX ADMIN — PROPOFOL 50 MCG/KG/MIN: 10 INJECTION, EMULSION INTRAVENOUS at 08:30

## 2021-01-01 RX ADMIN — ENOXAPARIN SODIUM 80 MG: 100 INJECTION SUBCUTANEOUS at 13:33

## 2021-01-01 RX ADMIN — ATORVASTATIN CALCIUM 40 MG: 40 TABLET, FILM COATED ORAL at 21:56

## 2021-01-01 RX ADMIN — Medication 50 MG: at 08:06

## 2021-01-01 RX ADMIN — NAFCILLIN SODIUM 1000 MG: 2 POWDER, FOR SOLUTION INTRAMUSCULAR; INTRAVENOUS at 06:40

## 2021-01-01 RX ADMIN — DEXAMETHASONE SODIUM PHOSPHATE 10 MG: 10 INJECTION, EMULSION INTRAMUSCULAR; INTRAVENOUS at 20:59

## 2021-01-01 RX ADMIN — NAFCILLIN SODIUM 1000 MG: 2 POWDER, FOR SOLUTION INTRAMUSCULAR; INTRAVENOUS at 10:48

## 2021-01-01 RX ADMIN — ENOXAPARIN SODIUM 80 MG: 100 INJECTION SUBCUTANEOUS at 01:38

## 2021-01-01 RX ADMIN — METOPROLOL TARTRATE 12.5 MG: 25 TABLET, FILM COATED ORAL at 08:12

## 2021-01-01 RX ADMIN — LORAZEPAM 1 MG: 2 INJECTION INTRAMUSCULAR; INTRAVENOUS at 21:17

## 2021-01-01 RX ADMIN — INSULIN LISPRO 2 UNITS: 100 INJECTION, SOLUTION INTRAVENOUS; SUBCUTANEOUS at 03:36

## 2021-01-01 RX ADMIN — SODIUM CHLORIDE, PRESERVATIVE FREE 10 ML: 5 INJECTION INTRAVENOUS at 20:40

## 2021-01-01 RX ADMIN — Medication 100 MCG/HR: at 11:36

## 2021-01-01 RX ADMIN — CLOPIDOGREL BISULFATE 75 MG: 75 TABLET ORAL at 09:42

## 2021-01-01 RX ADMIN — DEXAMETHASONE SODIUM PHOSPHATE 10 MG: 10 INJECTION, EMULSION INTRAMUSCULAR; INTRAVENOUS at 20:50

## 2021-01-01 RX ADMIN — Medication 8.8 MG: at 20:21

## 2021-01-01 RX ADMIN — LEVOTHYROXINE SODIUM 50 MCG: 0.05 TABLET ORAL at 06:20

## 2021-01-01 RX ADMIN — PROPOFOL 50 MCG/KG/MIN: 10 INJECTION, EMULSION INTRAVENOUS at 05:35

## 2021-01-01 RX ADMIN — OXYCODONE HYDROCHLORIDE AND ACETAMINOPHEN 1000 MG: 500 TABLET ORAL at 09:42

## 2021-01-01 RX ADMIN — CLOPIDOGREL BISULFATE 75 MG: 75 TABLET ORAL at 09:35

## 2021-01-01 RX ADMIN — METOPROLOL TARTRATE 12.5 MG: 25 TABLET, FILM COATED ORAL at 15:34

## 2021-01-01 RX ADMIN — NAFCILLIN SODIUM 1000 MG: 2 POWDER, FOR SOLUTION INTRAMUSCULAR; INTRAVENOUS at 00:22

## 2021-01-01 RX ADMIN — METOPROLOL TARTRATE 12.5 MG: 25 TABLET, FILM COATED ORAL at 18:40

## 2021-01-01 RX ADMIN — ASPIRIN 81 MG CHEWABLE TABLET 81 MG: 81 TABLET CHEWABLE at 08:25

## 2021-01-01 RX ADMIN — BARICITINIB 4 MG: 2 TABLET, FILM COATED ORAL at 08:12

## 2021-01-01 RX ADMIN — LEVOTHYROXINE SODIUM 50 MCG: 0.05 TABLET ORAL at 09:42

## 2021-01-01 RX ADMIN — INSULIN LISPRO 1 UNITS: 100 INJECTION, SOLUTION INTRAVENOUS; SUBCUTANEOUS at 20:36

## 2021-01-01 RX ADMIN — BARICITINIB 4 MG: 2 TABLET, FILM COATED ORAL at 08:06

## 2021-01-01 RX ADMIN — PROPOFOL 50 MCG/KG/MIN: 10 INJECTION, EMULSION INTRAVENOUS at 19:06

## 2021-01-01 RX ADMIN — NAFCILLIN SODIUM 1000 MG: 2 POWDER, FOR SOLUTION INTRAMUSCULAR; INTRAVENOUS at 22:53

## 2021-01-01 RX ADMIN — Medication 6 MG/HR: at 19:00

## 2021-01-01 RX ADMIN — ENOXAPARIN SODIUM 80 MG: 100 INJECTION SUBCUTANEOUS at 01:52

## 2021-01-01 RX ADMIN — DEXAMETHASONE SODIUM PHOSPHATE 10 MG: 10 INJECTION, EMULSION INTRAMUSCULAR; INTRAVENOUS at 20:48

## 2021-01-01 RX ADMIN — ENOXAPARIN SODIUM 80 MG: 100 INJECTION SUBCUTANEOUS at 13:31

## 2021-01-01 RX ADMIN — PROPOFOL 50 MCG/KG/MIN: 10 INJECTION, EMULSION INTRAVENOUS at 12:12

## 2021-01-01 RX ADMIN — PROPOFOL 50 MCG/KG/MIN: 10 INJECTION, EMULSION INTRAVENOUS at 21:32

## 2021-01-01 RX ADMIN — Medication 2000 UNITS: at 09:35

## 2021-01-01 RX ADMIN — ENOXAPARIN SODIUM 80 MG: 100 INJECTION SUBCUTANEOUS at 13:37

## 2021-01-01 RX ADMIN — INSULIN LISPRO 1 UNITS: 100 INJECTION, SOLUTION INTRAVENOUS; SUBCUTANEOUS at 21:04

## 2021-01-01 RX ADMIN — ENOXAPARIN SODIUM 80 MG: 100 INJECTION SUBCUTANEOUS at 14:04

## 2021-01-01 RX ADMIN — ENOXAPARIN SODIUM 90 MG: 100 INJECTION SUBCUTANEOUS at 14:57

## 2021-01-01 RX ADMIN — BARICITINIB 4 MG: 2 TABLET, FILM COATED ORAL at 10:13

## 2021-01-01 RX ADMIN — 0.12% CHLORHEXIDINE GLUCONATE 15 ML: 1.2 RINSE ORAL at 20:45

## 2021-01-01 RX ADMIN — PROPOFOL 50 MCG/KG/MIN: 10 INJECTION, EMULSION INTRAVENOUS at 21:46

## 2021-01-01 RX ADMIN — BENZONATATE 200 MG: 100 CAPSULE ORAL at 09:36

## 2021-01-01 RX ADMIN — DEXAMETHASONE SODIUM PHOSPHATE 20 MG: 4 INJECTION INTRA-ARTICULAR; INTRALESIONAL; INTRAMUSCULAR; INTRAVENOUS; SOFT TISSUE at 20:46

## 2021-01-01 RX ADMIN — ATORVASTATIN CALCIUM 40 MG: 40 TABLET, FILM COATED ORAL at 20:49

## 2021-01-01 RX ADMIN — CLOPIDOGREL BISULFATE 75 MG: 75 TABLET, FILM COATED ORAL at 09:11

## 2021-01-01 RX ADMIN — Medication 50 MG: at 09:41

## 2021-01-01 RX ADMIN — METOPROLOL TARTRATE 12.5 MG: 25 TABLET, FILM COATED ORAL at 10:04

## 2021-01-01 RX ADMIN — Medication 2000 UNITS: at 09:41

## 2021-01-01 RX ADMIN — ENOXAPARIN SODIUM 90 MG: 100 INJECTION SUBCUTANEOUS at 03:25

## 2021-01-01 RX ADMIN — OXYCODONE HYDROCHLORIDE AND ACETAMINOPHEN 500 MG: 500 TABLET ORAL at 15:30

## 2021-01-01 RX ADMIN — SODIUM CHLORIDE, PRESERVATIVE FREE 10 ML: 5 INJECTION INTRAVENOUS at 09:48

## 2021-01-01 RX ADMIN — ATORVASTATIN CALCIUM 40 MG: 40 TABLET, FILM COATED ORAL at 20:48

## 2021-01-01 RX ADMIN — METOPROLOL TARTRATE 12.5 MG: 25 TABLET, FILM COATED ORAL at 09:29

## 2021-01-01 RX ADMIN — FAMOTIDINE 20 MG: 10 INJECTION, SOLUTION INTRAVENOUS at 20:20

## 2021-01-01 RX ADMIN — SODIUM CHLORIDE, PRESERVATIVE FREE 10 ML: 5 INJECTION INTRAVENOUS at 09:35

## 2021-01-01 RX ADMIN — FAMOTIDINE 20 MG: 10 INJECTION, SOLUTION INTRAVENOUS at 09:10

## 2021-01-01 RX ADMIN — LEVOTHYROXINE SODIUM 50 MCG: 0.05 TABLET ORAL at 06:46

## 2021-01-01 RX ADMIN — ATORVASTATIN CALCIUM 40 MG: 40 TABLET, FILM COATED ORAL at 21:00

## 2021-01-01 RX ADMIN — 0.12% CHLORHEXIDINE GLUCONATE 15 ML: 1.2 RINSE ORAL at 20:57

## 2021-01-01 RX ADMIN — NAFCILLIN SODIUM 1000 MG: 2 POWDER, FOR SOLUTION INTRAMUSCULAR; INTRAVENOUS at 18:21

## 2021-01-01 RX ADMIN — CLOPIDOGREL BISULFATE 75 MG: 75 TABLET ORAL at 10:55

## 2021-01-01 RX ADMIN — METOPROLOL TARTRATE 12.5 MG: 25 TABLET, FILM COATED ORAL at 10:13

## 2021-01-01 RX ADMIN — BENZONATATE 200 MG: 100 CAPSULE ORAL at 10:55

## 2021-01-01 RX ADMIN — METOPROLOL TARTRATE 12.5 MG: 25 TABLET, FILM COATED ORAL at 09:41

## 2021-01-01 RX ADMIN — NAFCILLIN SODIUM 1000 MG: 2 POWDER, FOR SOLUTION INTRAMUSCULAR; INTRAVENOUS at 14:45

## 2021-01-01 RX ADMIN — Medication 4 MCG/MIN: at 13:35

## 2021-01-01 RX ADMIN — Medication 2 MCG/MIN: at 12:53

## 2021-01-01 RX ADMIN — ACETAMINOPHEN 650 MG: 325 TABLET ORAL at 23:33

## 2021-01-01 RX ADMIN — CISATRACURIUM BESYLATE 10 MG: 2 INJECTION INTRAVENOUS at 09:25

## 2021-01-01 RX ADMIN — OXYCODONE HYDROCHLORIDE AND ACETAMINOPHEN 1000 MG: 500 TABLET ORAL at 09:31

## 2021-01-01 RX ADMIN — CISATRACURIUM BESYLATE 3 MCG/KG/MIN: 10 INJECTION INTRAVENOUS at 16:26

## 2021-01-01 RX ADMIN — Medication 100 MCG/HR: at 08:50

## 2021-01-01 RX ADMIN — Medication 8.8 MG: at 20:26

## 2021-01-01 RX ADMIN — Medication 50 MG: at 09:36

## 2021-01-01 RX ADMIN — NAFCILLIN SODIUM 1000 MG: 2 POWDER, FOR SOLUTION INTRAMUSCULAR; INTRAVENOUS at 02:57

## 2021-01-01 RX ADMIN — Medication 100 MCG/HR: at 14:20

## 2021-01-01 RX ADMIN — ASPIRIN 81 MG CHEWABLE TABLET 81 MG: 81 TABLET CHEWABLE at 09:35

## 2021-01-01 RX ADMIN — LEVOTHYROXINE SODIUM 50 MCG: 0.05 TABLET ORAL at 03:25

## 2021-01-01 RX ADMIN — METOPROLOL TARTRATE 12.5 MG: 25 TABLET, FILM COATED ORAL at 08:26

## 2021-01-01 RX ADMIN — BARICITINIB 4 MG: 2 TABLET, FILM COATED ORAL at 09:42

## 2021-01-01 RX ADMIN — PROPOFOL 50 MCG/KG/MIN: 10 INJECTION, EMULSION INTRAVENOUS at 01:05

## 2021-01-01 RX ADMIN — Medication 100 MCG/HR: at 09:23

## 2021-01-01 RX ADMIN — ENOXAPARIN SODIUM 90 MG: 100 INJECTION SUBCUTANEOUS at 14:11

## 2021-01-01 RX ADMIN — Medication 100 MCG/HR: at 03:28

## 2021-01-01 RX ADMIN — ATORVASTATIN CALCIUM 40 MG: 40 TABLET, FILM COATED ORAL at 22:04

## 2021-01-01 RX ADMIN — BARICITINIB 4 MG: 2 TABLET, FILM COATED ORAL at 08:02

## 2021-01-01 RX ADMIN — NAFCILLIN SODIUM 1000 MG: 2 POWDER, FOR SOLUTION INTRAMUSCULAR; INTRAVENOUS at 15:30

## 2021-01-01 RX ADMIN — ENOXAPARIN SODIUM 90 MG: 100 INJECTION SUBCUTANEOUS at 02:25

## 2021-01-01 RX ADMIN — BARICITINIB 4 MG: 2 TABLET, FILM COATED ORAL at 10:55

## 2021-01-01 RX ADMIN — PROPOFOL 50 MCG/KG/MIN: 10 INJECTION, EMULSION INTRAVENOUS at 13:28

## 2021-01-01 RX ADMIN — CLOPIDOGREL BISULFATE 75 MG: 75 TABLET, FILM COATED ORAL at 08:13

## 2021-01-01 RX ADMIN — 0.12% CHLORHEXIDINE GLUCONATE 15 ML: 1.2 RINSE ORAL at 09:10

## 2021-01-01 RX ADMIN — Medication 100 MCG/HR: at 05:59

## 2021-01-01 RX ADMIN — BARICITINIB 4 MG: 2 TABLET, FILM COATED ORAL at 08:25

## 2021-01-01 RX ADMIN — POLYETHYLENE GLYCOL 3350 17 G: 17 POWDER, FOR SOLUTION ORAL at 16:56

## 2021-01-01 RX ADMIN — Medication 6 MG/HR: at 13:31

## 2021-01-01 RX ADMIN — LEVOTHYROXINE SODIUM 50 MCG: 0.05 TABLET ORAL at 08:02

## 2021-01-01 RX ADMIN — SODIUM CHLORIDE, PRESERVATIVE FREE 10 ML: 5 INJECTION INTRAVENOUS at 21:02

## 2021-01-01 RX ADMIN — ASPIRIN 81 MG CHEWABLE TABLET 81 MG: 81 TABLET CHEWABLE at 08:06

## 2021-01-01 RX ADMIN — 0.12% CHLORHEXIDINE GLUCONATE 15 ML: 1.2 RINSE ORAL at 08:27

## 2021-01-01 RX ADMIN — METOPROLOL TARTRATE 12.5 MG: 25 TABLET, FILM COATED ORAL at 20:49

## 2021-01-01 RX ADMIN — NITROGLYCERIN 5 MCG/MIN: 20 INJECTION INTRAVENOUS at 22:25

## 2021-01-01 RX ADMIN — FAMOTIDINE 20 MG: 10 INJECTION, SOLUTION INTRAVENOUS at 08:27

## 2021-01-01 RX ADMIN — ENOXAPARIN SODIUM 80 MG: 100 INJECTION SUBCUTANEOUS at 02:26

## 2021-01-01 RX ADMIN — CLOPIDOGREL BISULFATE 75 MG: 75 TABLET ORAL at 08:06

## 2021-01-01 RX ADMIN — NAFCILLIN SODIUM 1000 MG: 2 POWDER, FOR SOLUTION INTRAMUSCULAR; INTRAVENOUS at 22:15

## 2021-01-01 RX ADMIN — OXYCODONE HYDROCHLORIDE AND ACETAMINOPHEN 1000 MG: 500 TABLET ORAL at 10:13

## 2021-01-01 RX ADMIN — CISATRACURIUM BESYLATE 2 MCG/KG/MIN: 10 INJECTION INTRAVENOUS at 09:38

## 2021-01-01 RX ADMIN — SODIUM CHLORIDE, PRESERVATIVE FREE 10 ML: 5 INJECTION INTRAVENOUS at 21:44

## 2021-01-01 RX ADMIN — Medication 6 MG/HR: at 05:59

## 2021-01-01 RX ADMIN — OXYCODONE HYDROCHLORIDE AND ACETAMINOPHEN 1000 MG: 500 TABLET ORAL at 10:55

## 2021-01-01 RX ADMIN — PROPOFOL 50 MCG/KG/MIN: 10 INJECTION, EMULSION INTRAVENOUS at 07:17

## 2021-01-01 RX ADMIN — Medication 1 CAPSULE: at 09:10

## 2021-01-01 RX ADMIN — NAFCILLIN SODIUM 1000 MG: 2 POWDER, FOR SOLUTION INTRAMUSCULAR; INTRAVENOUS at 18:33

## 2021-01-01 RX ADMIN — POLYETHYLENE GLYCOL 3350 17 G: 17 POWDER, FOR SOLUTION ORAL at 09:11

## 2021-01-01 RX ADMIN — Medication 100 MCG/HR: at 19:35

## 2021-01-01 RX ADMIN — Medication 1000 UNITS: at 15:30

## 2021-01-01 RX ADMIN — ACETAMINOPHEN 650 MG: 325 TABLET ORAL at 13:27

## 2021-01-01 RX ADMIN — ASPIRIN 81 MG CHEWABLE TABLET 81 MG: 81 TABLET CHEWABLE at 08:12

## 2021-01-01 RX ADMIN — Medication 100 MCG/HR: at 01:07

## 2021-01-01 RX ADMIN — SODIUM CHLORIDE, PRESERVATIVE FREE 10 ML: 5 INJECTION INTRAVENOUS at 22:05

## 2021-01-01 RX ADMIN — Medication 50 MG: at 10:55

## 2021-01-01 RX ADMIN — Medication 1 CAPSULE: at 08:02

## 2021-01-01 RX ADMIN — SODIUM CHLORIDE 25 ML: 9 INJECTION, SOLUTION INTRAVENOUS at 22:22

## 2021-01-01 RX ADMIN — PROPOFOL 50 MCG/KG/MIN: 10 INJECTION, EMULSION INTRAVENOUS at 03:32

## 2021-01-01 RX ADMIN — FAMOTIDINE 20 MG: 10 INJECTION, SOLUTION INTRAVENOUS at 08:13

## 2021-01-01 RX ADMIN — FAMOTIDINE 20 MG: 10 INJECTION, SOLUTION INTRAVENOUS at 20:57

## 2021-01-01 RX ADMIN — ENOXAPARIN SODIUM 90 MG: 100 INJECTION SUBCUTANEOUS at 15:57

## 2021-01-01 RX ADMIN — CISATRACURIUM BESYLATE 3 MCG/KG/MIN: 10 INJECTION INTRAVENOUS at 07:13

## 2021-01-01 RX ADMIN — ASPIRIN 81 MG CHEWABLE TABLET 81 MG: 81 TABLET CHEWABLE at 10:13

## 2021-01-01 RX ADMIN — NAFCILLIN SODIUM 1000 MG: 2 POWDER, FOR SOLUTION INTRAMUSCULAR; INTRAVENOUS at 10:50

## 2021-01-01 RX ADMIN — ATORVASTATIN CALCIUM 40 MG: 40 TABLET, FILM COATED ORAL at 20:20

## 2021-01-01 RX ADMIN — BARICITINIB 4 MG: 2 TABLET, FILM COATED ORAL at 09:36

## 2021-01-01 RX ADMIN — NAFCILLIN SODIUM 1000 MG: 2 POWDER, FOR SOLUTION INTRAMUSCULAR; INTRAVENOUS at 15:13

## 2021-01-01 RX ADMIN — ASPIRIN 81 MG CHEWABLE TABLET 81 MG: 81 TABLET CHEWABLE at 09:28

## 2021-01-01 RX ADMIN — LEVOTHYROXINE SODIUM 50 MCG: 0.05 TABLET ORAL at 06:38

## 2021-01-01 RX ADMIN — DEXAMETHASONE SODIUM PHOSPHATE 14 MG: 4 INJECTION, SOLUTION INTRAMUSCULAR; INTRAVENOUS at 23:53

## 2021-01-01 RX ADMIN — METOPROLOL TARTRATE 12.5 MG: 25 TABLET, FILM COATED ORAL at 08:05

## 2021-01-01 RX ADMIN — BENZONATATE 200 MG: 100 CAPSULE ORAL at 10:13

## 2021-01-01 RX ADMIN — Medication 6 MG/HR: at 16:22

## 2021-01-01 RX ADMIN — BARICITINIB 4 MG: 2 TABLET, FILM COATED ORAL at 09:11

## 2021-01-01 RX ADMIN — METOPROLOL TARTRATE 12.5 MG: 25 TABLET, FILM COATED ORAL at 15:56

## 2021-01-01 RX ADMIN — NAFCILLIN SODIUM 1000 MG: 2 POWDER, FOR SOLUTION INTRAMUSCULAR; INTRAVENOUS at 14:53

## 2021-01-01 RX ADMIN — Medication 2000 UNITS: at 08:05

## 2021-01-01 RX ADMIN — Medication 8.8 MG: at 20:45

## 2021-01-01 RX ADMIN — DEXAMETHASONE SODIUM PHOSPHATE 20 MG: 4 INJECTION INTRA-ARTICULAR; INTRALESIONAL; INTRAMUSCULAR; INTRAVENOUS; SOFT TISSUE at 20:39

## 2021-01-01 RX ADMIN — CISATRACURIUM BESYLATE 3 MCG/KG/MIN: 10 INJECTION INTRAVENOUS at 13:38

## 2021-01-01 RX ADMIN — SODIUM CHLORIDE, PRESERVATIVE FREE 10 ML: 5 INJECTION INTRAVENOUS at 08:27

## 2021-01-01 RX ADMIN — PROPOFOL 50 MCG/KG/MIN: 10 INJECTION, EMULSION INTRAVENOUS at 17:56

## 2021-01-01 RX ADMIN — PROPOFOL 50 MCG/KG/MIN: 10 INJECTION, EMULSION INTRAVENOUS at 13:04

## 2021-01-01 RX ADMIN — Medication 100 MCG/HR: at 14:37

## 2021-01-01 RX ADMIN — NAFCILLIN SODIUM 1000 MG: 2 POWDER, FOR SOLUTION INTRAMUSCULAR; INTRAVENOUS at 23:29

## 2021-01-01 RX ADMIN — PROPOFOL 50 MCG/KG/MIN: 10 INJECTION, EMULSION INTRAVENOUS at 14:35

## 2021-01-01 RX ADMIN — PROPOFOL 50 MCG/KG/MIN: 10 INJECTION, EMULSION INTRAVENOUS at 22:21

## 2021-01-01 RX ADMIN — CLOPIDOGREL BISULFATE 75 MG: 75 TABLET, FILM COATED ORAL at 08:02

## 2021-01-01 RX ADMIN — LEVOTHYROXINE SODIUM 50 MCG: 0.05 TABLET ORAL at 08:06

## 2021-01-01 RX ADMIN — HEPARIN SODIUM AND DEXTROSE 10 UNITS/KG/HR: 10000; 5 INJECTION INTRAVENOUS at 21:42

## 2021-01-01 RX ADMIN — Medication 2000 UNITS: at 10:13

## 2021-01-01 RX ADMIN — METOPROLOL TARTRATE 12.5 MG: 25 TABLET, FILM COATED ORAL at 22:04

## 2021-01-01 RX ADMIN — Medication 25 MCG/HR: at 05:58

## 2021-01-01 RX ADMIN — Medication 2000 UNITS: at 10:55

## 2021-01-01 RX ADMIN — BENZONATATE 200 MG: 100 CAPSULE ORAL at 18:46

## 2021-01-01 RX ADMIN — Medication 2000 UNITS: at 23:49

## 2021-01-01 RX ADMIN — CLOPIDOGREL BISULFATE 75 MG: 75 TABLET ORAL at 09:28

## 2021-01-01 RX ADMIN — ASPIRIN 81 MG CHEWABLE TABLET 81 MG: 81 TABLET CHEWABLE at 08:02

## 2021-01-01 RX ADMIN — Medication 8.8 MG: at 20:59

## 2021-01-01 RX ADMIN — 0.12% CHLORHEXIDINE GLUCONATE 15 ML: 1.2 RINSE ORAL at 20:26

## 2021-01-01 RX ADMIN — METOPROLOL TARTRATE 12.5 MG: 25 TABLET, FILM COATED ORAL at 20:26

## 2021-01-01 RX ADMIN — SODIUM CHLORIDE, PRESERVATIVE FREE 10 ML: 5 INJECTION INTRAVENOUS at 08:01

## 2021-01-01 RX ADMIN — METOPROLOL TARTRATE 12.5 MG: 25 TABLET, FILM COATED ORAL at 23:49

## 2021-01-01 RX ADMIN — FENTANYL CITRATE 25 MCG: 50 INJECTION, SOLUTION INTRAMUSCULAR; INTRAVENOUS at 05:53

## 2021-01-01 RX ADMIN — BARICITINIB 4 MG: 2 TABLET, FILM COATED ORAL at 09:28

## 2021-01-01 RX ADMIN — PROPOFOL 50 MCG/KG/MIN: 10 INJECTION, EMULSION INTRAVENOUS at 06:33

## 2021-01-01 RX ADMIN — NAFCILLIN SODIUM 1000 MG: 2 POWDER, FOR SOLUTION INTRAMUSCULAR; INTRAVENOUS at 18:55

## 2021-01-01 RX ADMIN — Medication 125 MCG/HR: at 19:50

## 2021-01-01 RX ADMIN — NAFCILLIN SODIUM 1000 MG: 2 POWDER, FOR SOLUTION INTRAMUSCULAR; INTRAVENOUS at 02:36

## 2021-01-01 RX ADMIN — ATORVASTATIN CALCIUM 40 MG: 40 TABLET, FILM COATED ORAL at 18:46

## 2021-01-01 RX ADMIN — ATORVASTATIN CALCIUM 40 MG: 40 TABLET, FILM COATED ORAL at 20:39

## 2021-01-01 RX ADMIN — ENOXAPARIN SODIUM 90 MG: 100 INJECTION SUBCUTANEOUS at 15:33

## 2021-01-01 RX ADMIN — ATORVASTATIN CALCIUM 40 MG: 40 TABLET, FILM COATED ORAL at 21:41

## 2021-01-01 RX ADMIN — FAMOTIDINE 20 MG: 10 INJECTION, SOLUTION INTRAVENOUS at 20:45

## 2021-01-01 RX ADMIN — Medication 6 MCG/MIN: at 14:50

## 2021-01-01 RX ADMIN — METOPROLOL TARTRATE 2.5 MG: 5 INJECTION INTRAVENOUS at 20:20

## 2021-01-01 RX ADMIN — OXYCODONE HYDROCHLORIDE AND ACETAMINOPHEN 1000 MG: 500 TABLET ORAL at 09:36

## 2021-01-01 RX ADMIN — CISATRACURIUM BESYLATE 3 MCG/KG/MIN: 10 INJECTION INTRAVENOUS at 20:51

## 2021-01-01 RX ADMIN — FENTANYL CITRATE 25 MCG: 50 INJECTION, SOLUTION INTRAMUSCULAR; INTRAVENOUS at 05:34

## 2021-01-01 RX ADMIN — Medication 1 MG/HR: at 05:20

## 2021-01-01 RX ADMIN — POLYETHYLENE GLYCOL 3350 17 G: 17 POWDER, FOR SOLUTION ORAL at 08:25

## 2021-01-01 RX ADMIN — SODIUM CHLORIDE, PRESERVATIVE FREE 10 ML: 5 INJECTION INTRAVENOUS at 09:30

## 2021-01-01 RX ADMIN — 0.12% CHLORHEXIDINE GLUCONATE 15 ML: 1.2 RINSE ORAL at 08:26

## 2021-01-01 RX ADMIN — ACETAMINOPHEN 650 MG: 650 SUPPOSITORY RECTAL at 20:52

## 2021-01-01 RX ADMIN — Medication 50 MG: at 09:28

## 2021-01-01 RX ADMIN — LOSARTAN POTASSIUM 25 MG: 25 TABLET, FILM COATED ORAL at 09:41

## 2021-01-01 RX ADMIN — METOPROLOL TARTRATE 12.5 MG: 25 TABLET, FILM COATED ORAL at 09:38

## 2021-01-01 RX ADMIN — ENOXAPARIN SODIUM 90 MG: 100 INJECTION SUBCUTANEOUS at 13:11

## 2021-01-01 RX ADMIN — CLOPIDOGREL BISULFATE 75 MG: 75 TABLET ORAL at 10:13

## 2021-01-01 RX ADMIN — Medication 2000 UNITS: at 09:28

## 2021-01-01 RX ADMIN — Medication 100 MCG/HR: at 16:49

## 2021-01-01 RX ADMIN — Medication 100 MCG/HR: at 22:34

## 2021-01-01 RX ADMIN — POLYETHYLENE GLYCOL 3350 17 G: 17 POWDER, FOR SOLUTION ORAL at 08:02

## 2021-01-01 RX ADMIN — Medication 100 MCG/HR: at 21:46

## 2021-01-01 RX ADMIN — ACETAMINOPHEN 650 MG: 325 TABLET ORAL at 00:01

## 2021-01-01 RX ADMIN — BENZOCAINE AND MENTHOL 1 LOZENGE: 15; 3.6 LOZENGE ORAL at 03:46

## 2021-01-01 RX ADMIN — ACETAMINOPHEN 650 MG: 325 TABLET ORAL at 14:11

## 2021-01-01 RX ADMIN — OXYCODONE HYDROCHLORIDE AND ACETAMINOPHEN 1000 MG: 500 TABLET ORAL at 08:06

## 2021-01-01 RX ADMIN — CISATRACURIUM BESYLATE 3 MCG/KG/MIN: 10 INJECTION INTRAVENOUS at 01:26

## 2021-01-01 RX ADMIN — PROPOFOL 50 MCG/KG/MIN: 10 INJECTION, EMULSION INTRAVENOUS at 10:12

## 2021-01-01 RX ADMIN — ASPIRIN 81 MG CHEWABLE TABLET 81 MG: 81 TABLET CHEWABLE at 09:42

## 2021-01-01 RX ADMIN — SODIUM CHLORIDE, PRESERVATIVE FREE 5 ML: 5 INJECTION INTRAVENOUS at 22:00

## 2021-01-01 RX ADMIN — NAFCILLIN SODIUM 1000 MG: 2 POWDER, FOR SOLUTION INTRAMUSCULAR; INTRAVENOUS at 20:55

## 2021-01-01 RX ADMIN — Medication 50 MG: at 15:30

## 2021-01-01 RX ADMIN — LORAZEPAM 1 MG: 2 INJECTION INTRAMUSCULAR; INTRAVENOUS at 23:52

## 2021-01-01 RX ADMIN — LEVOTHYROXINE SODIUM 50 MCG: 0.05 TABLET ORAL at 09:36

## 2021-01-01 RX ADMIN — LEVOTHYROXINE SODIUM 50 MCG: 0.05 TABLET ORAL at 08:12

## 2021-01-01 RX ADMIN — BARICITINIB 4 MG: 2 TABLET, FILM COATED ORAL at 00:01

## 2021-01-01 RX ADMIN — SODIUM CHLORIDE, PRESERVATIVE FREE 10 ML: 5 INJECTION INTRAVENOUS at 20:36

## 2021-01-01 RX ADMIN — PROPOFOL 50 MCG/KG/MIN: 10 INJECTION, EMULSION INTRAVENOUS at 16:48

## 2021-01-01 RX ADMIN — SODIUM CHLORIDE, PRESERVATIVE FREE 10 ML: 5 INJECTION INTRAVENOUS at 10:15

## 2021-01-01 RX ADMIN — PROPOFOL 50 MCG/KG/MIN: 10 INJECTION, EMULSION INTRAVENOUS at 05:17

## 2021-01-01 RX ADMIN — LEVOTHYROXINE SODIUM 50 MCG: 0.05 TABLET ORAL at 06:07

## 2021-01-01 RX ADMIN — METOPROLOL TARTRATE 12.5 MG: 25 TABLET, FILM COATED ORAL at 20:59

## 2021-01-01 RX ADMIN — Medication 100 MCG/HR: at 04:19

## 2021-01-01 RX ADMIN — ACETAMINOPHEN 650 MG: 325 TABLET ORAL at 13:33

## 2021-01-01 RX ADMIN — Medication 100 MCG/HR: at 16:47

## 2021-01-01 RX ADMIN — FAMOTIDINE 20 MG: 10 INJECTION, SOLUTION INTRAVENOUS at 20:26

## 2021-01-01 RX ADMIN — PROPOFOL 49.96 MCG/KG/MIN: 10 INJECTION, EMULSION INTRAVENOUS at 23:27

## 2021-01-01 RX ADMIN — PROPOFOL 50 MCG/KG/MIN: 10 INJECTION, EMULSION INTRAVENOUS at 16:36

## 2021-01-01 RX ADMIN — DEXAMETHASONE SODIUM PHOSPHATE 20 MG: 4 INJECTION INTRA-ARTICULAR; INTRALESIONAL; INTRAMUSCULAR; INTRAVENOUS; SOFT TISSUE at 21:56

## 2021-01-01 RX ADMIN — ENOXAPARIN SODIUM 90 MG: 100 INJECTION SUBCUTANEOUS at 02:03

## 2021-01-01 RX ADMIN — NAFCILLIN SODIUM 1000 MG: 2 POWDER, FOR SOLUTION INTRAMUSCULAR; INTRAVENOUS at 06:20

## 2021-01-01 RX ADMIN — Medication 125 MCG/HR: at 23:56

## 2021-01-01 RX ADMIN — ASPIRIN 81 MG CHEWABLE TABLET 81 MG: 81 TABLET CHEWABLE at 10:55

## 2021-01-01 RX ADMIN — SODIUM CHLORIDE, PRESERVATIVE FREE 10 ML: 5 INJECTION INTRAVENOUS at 20:21

## 2021-01-01 RX ADMIN — Medication 6 MG/HR: at 22:51

## 2021-01-01 RX ADMIN — NAFCILLIN SODIUM 1000 MG: 2 POWDER, FOR SOLUTION INTRAMUSCULAR; INTRAVENOUS at 11:36

## 2021-01-01 RX ADMIN — 0.12% CHLORHEXIDINE GLUCONATE 15 ML: 1.2 RINSE ORAL at 20:20

## 2021-01-01 RX ADMIN — PROPOFOL 50 MCG/KG/MIN: 10 INJECTION, EMULSION INTRAVENOUS at 10:49

## 2021-01-01 RX ADMIN — DEXAMETHASONE SODIUM PHOSPHATE 10 MG: 10 INJECTION, EMULSION INTRAMUSCULAR; INTRAVENOUS at 20:26

## 2021-01-01 RX ADMIN — DEXAMETHASONE SODIUM PHOSPHATE 10 MG: 10 INJECTION, EMULSION INTRAMUSCULAR; INTRAVENOUS at 22:05

## 2021-01-01 RX ADMIN — ENOXAPARIN SODIUM 90 MG: 100 INJECTION SUBCUTANEOUS at 01:05

## 2021-01-01 RX ADMIN — PROPOFOL 50 MCG/KG/MIN: 10 INJECTION, EMULSION INTRAVENOUS at 01:16

## 2021-01-01 RX ADMIN — 0.12% CHLORHEXIDINE GLUCONATE 15 ML: 1.2 RINSE ORAL at 08:01

## 2021-01-01 RX ADMIN — Medication 2 MCG/MIN: at 00:39

## 2021-01-01 RX ADMIN — ENOXAPARIN SODIUM 90 MG: 100 INJECTION SUBCUTANEOUS at 13:45

## 2021-01-01 RX ADMIN — ASPIRIN 81 MG CHEWABLE TABLET 81 MG: 81 TABLET CHEWABLE at 09:11

## 2021-01-01 RX ADMIN — ATORVASTATIN CALCIUM 40 MG: 40 TABLET, FILM COATED ORAL at 20:26

## 2021-01-01 RX ADMIN — Medication 100 MCG/HR: at 11:23

## 2021-01-01 ASSESSMENT — PAIN SCALES - GENERAL
PAINLEVEL_OUTOF10: 0
PAINLEVEL_OUTOF10: 2
PAINLEVEL_OUTOF10: 0

## 2021-01-01 ASSESSMENT — PULMONARY FUNCTION TESTS
PIF_VALUE: 36
PIF_VALUE: 37
PIF_VALUE: 41
PIF_VALUE: 38
PIF_VALUE: 37
PIF_VALUE: 38
PIF_VALUE: 37
PIF_VALUE: 39
PIF_VALUE: 36
PIF_VALUE: 37
PIF_VALUE: 41
PIF_VALUE: 37

## 2021-01-01 ASSESSMENT — PAIN DESCRIPTION - ORIENTATION: ORIENTATION: LEFT

## 2021-01-01 ASSESSMENT — PAIN DESCRIPTION - LOCATION: LOCATION: CHEST

## 2021-01-01 ASSESSMENT — PAIN SCALES - WONG BAKER
WONGBAKER_NUMERICALRESPONSE: 0

## 2021-01-01 ASSESSMENT — PAIN DESCRIPTION - PAIN TYPE: TYPE: ACUTE PAIN

## 2021-12-21 PROBLEM — J96.01 ACUTE HYPOXEMIC RESPIRATORY FAILURE DUE TO COVID-19 (HCC): Status: ACTIVE | Noted: 2021-01-01

## 2021-12-21 PROBLEM — I21.4 NSTEMI (NON-ST ELEVATED MYOCARDIAL INFARCTION) (HCC): Status: ACTIVE | Noted: 2021-01-01

## 2021-12-21 PROBLEM — U07.1 ACUTE HYPOXEMIC RESPIRATORY FAILURE DUE TO COVID-19 (HCC): Status: ACTIVE | Noted: 2021-01-01

## 2021-12-21 NOTE — PROGRESS NOTES
Transfer  Report from Michelle Mackey  Referring Physician Dr. Michel Nunez  Accepting Physician Dr. Rogelio Almeida  Patient Condition: 48yo on way currently from [facility] ER as initially was suspected STEMI. Dr. Wesley Diaz reviewed EKG and spoke with referring DrAnya Tsangshayna is NOT a STEMI and doesn't need emergent intervention. But will need cath regardless. Update from Dr. Michel Nunez: Pt admitted on 12-15 with worsening SOB-diagnosed with COVID-progressed onto bipap 16/8-O2 sats 94%. CP started last night-EKG showed mild ST elevation in leads 2&3 at that time. This morning EKG neg. Trop continues to trend up-last one being 2.14. Currently on Hep and NTG gtt. Also received beta blocker, ASA. Rocephin, decadron, and remdesivir given earlier on admission.  /69

## 2021-12-22 NOTE — CONSULTS
[address] [place] [address]                                  CONSULTATION    PATIENT NAME: Americo Cardoza                       :        1970  MED REC NO:   [id number]                           ROOM:       0032  ACCOUNT NO:   [de-identified]                           ADMIT DATE: 2021  PROVIDER:     Carol Rey. Adore Reynaga M.D.    Rl Flowers:  2021    REASON FOR CONSULTATION:  Acute coronary syndrome in 57-year-old  gentleman, admitted with COVID-19 pneumonia and hypoxic respiratory  failure on BiPAP, 100% oxygen. HISTORY OF PRESENT ILLNESS:  This is actually a 57-year-old   gentleman with past medical history of hypertension, hyperlipidemia,  renal stone, irritable bowel syndrome, hypothyroidism, transferred to  [address] [place] [address] on 2021 from [facility] because of chest pain and troponin elevation and presumed to be  non-ST-elevation myocardial infarction. The patient has been  hospitalized in [facility] starting from 12/15/2021 for hypoxic respiratory  failure secondary to COVID-19 pneumonia and so yesterday on 2021,  the patient started to have chest pain, retrosternal pressure, heaviness  like, 5/10 radiating, no associated sweating or diaphoresis, but  persisted for almost over an hour until he started a nitroglycerin drip. With the nitroglycerin drip, the chest pain resolved and he has been  chest pain free and this morning has been off nitro and has still been  chest pain free. The patient is still in hypoxic respiratory failure,  on BiPAP with 100% oxygen requirement to saturate 91-92%. The patient has a history of signs and symptoms of viral infection,  apparently generalized body weakness and fever since  and got  admitted on 12/15 at [facility] after COVID-19 PCR was found to be  positive and he was hypoxic at that time saturating 84% at room air.   CT  of the chest showed on admission multifocal pneumonia without evidence  of pulmonary thromboembolism, was then started on Decadron, remdesivir,  azithromycin. The patient completed the course of remdesivir, the first  dose was 12/15. Progressively, he is short of breath, got worse and the  patient required BiPAP and so troponin initially was mildly elevated to  0.5 and subsequently went up to 0.76 at [facility] and the patient has been  chest pain free initially and until yesterday when he started to have  chest pain and required some nitroglycerin drip and EKG showed  nonspecific ST-T segment abnormality. Cardiologist at [facility],  Dr. Barney Fleming has been consulted and evaluated the patient and recommended  cardiac catheterization and the patient transferred to our medical  center. REVIEW OF SYSTEMS:  Negative except the above-mentioned ones. He is  still having fever, still in respiratory failure. PAST MEDICAL HISTORY:  Includes hypertension and hypothyroidism. History of renal calculi, irritable bowel syndrome. PAST SURGICAL HISTORY:  None. FAMILY HISTORY:  Father has coronary artery disease, has intervention  with the stent in his 46s. SOCIAL HISTORY:  He is a nonsmoker. No alcohol. No drug use. ALLERGIES:  NO KNOWN DRUG ALLERGIES. HOME MEDICATIONS:  Prior to this admission, amlodipine 10, losartan 50,  levothyroxine 100. PHYSICAL EXAMINATION:  GENERAL:  Looked sick, in severe respiratory distress on BiPAP with FiO2  of 100%. VITAL SIGNS:  Blood pressure _____, respiratory rate 20, and temperature  97.8. HEENT:  Pink conjunctivae. Anicteric sclerae. Pupils are equal and  reactive bilateral to light. NECK:  No lymphadenopathy. No goiter. No JVD. CHEST:  Bilateral crackles. CARDIOVASCULAR:  Arteries are felt; carotid, femoral.  S1, S2 well  heard. No murmur or gallop rhythm. ABDOMEN:  Soft, nontender, and nondistended. Bowel sounds are positive.   GENITOURINARY:  No CVA, flank, or suprapubic tenderness. LOCOMOTOR:  No cyanosis, clubbing, muscle or joint tenderness. SKIN:  No rashes, ulcers, or nodules. CNS:  Alert, awake, and oriented to time, person, and place. Cranial  nerves are intact. Sensation is intact to light touch and pain. Motor:  Normal muscle strength and tone. Appropriate mood and affect. WORKUP:  EKG, sinus rhythm with right bundle-branch block, incomplete  nonspecific ST-T abnormality and no previous EKG available. No ST  elevation. There is evidence of old inferior infarction. After here admission in the hospital; sodium 135, potassium 5, BUN 23,  creatinine 0.8, magnesium 2. Troponin 0.3, then 0.29, so elevated and  trending down. ASSESSMENT:  1. Acute hypoxic respiratory failure secondary to COVID-19 pneumonia,  on BiPAP with 100% FiO2, one cannot rule out underlying pulmonary  congestion. 2.  Pulmonary edema. We will get a chest x-ray and we will do a BNP. 3.  COVID-19 pneumonia. 4.  Possible superimposed bacterial pneumonia with a possible sepsis. Still, the patient is febrile. Since admission, the maximum temperature  that has been recorded here is 101.4.  5.  NSTEMI with chest pain, troponin elevation and abnormal EKG. The  patient is on heparin and we will continue the nitroglycerin drip,  heparin drip, aspirin, Plavix, and statins. We will get cardiac  catheterization ASAP. History of hypertension, hyperlipidemia,  hypothyroidism. PLAN AND RECOMMENDATION:  1. The patient is a unfortunate gentleman with COVID-19 pneumonia,  hypoxic respiratory failure, possible pulmonary congestion cannot be  ruled out. The patient will continue with aggressive medical therapy  that he has been getting. We will continue with medical therapy for  acute coronary artery syndrome and we will get cardiac catheterization  as soon as possible. Risks and benefits have been explained and the  patient verbalized understanding and agreed with plan of care.   We will  continue heparin, nitroglycerin drip, statin, beta blocker, dual  antiplatelet therapy. 2.  Based on the course, we will gauge further care. We will get a  chest x-ray, BNP and echocardiogram.    Thank you for allowing me to participate in the care of this patient. I  will follow up with you. Rossana Ybarra.  Lyndsey Harvey M.D.    D: 12/22/2021 10:16:21       T: 12/22/2021 11:35:04     OCTAVIANO/DIAMOND  Job#: [id number]     Doc#: [id number]    CC:

## 2021-12-22 NOTE — PROGRESS NOTES
Hospitalist Progress Note    Patient:  Tri Rondon      Unit/Bed:3B-32/032-A    YOB: 1970    MRN: [id number]       Acct: [de-identified]     PCP: Oswaldo Muse    Date of Admission: 12/21/2021    Assessment/Plan:    Anticipated Discharge in :     MARCUS/Angel Luis Pierson [address] Problems    Diagnosis Date Noted    NSTEMI (non-ST elevated myocardial infarction) ([facility] [address] [I21.4] 12/21/2021    Acute hypoxemic respiratory failure due to COVID-19 ([facility] [address] [U07.1, J96.01] 12/21/2021       Acute hypoxic respiratory failure secondary to COVID-19 pneumonia, unvaccinated  Requiring high O2 support through BiPAP on max settings  Onset of symptoms 12/7/2021. COVID-19 PCR positive on 12/15/2021. Currently requiring high O2 support with BiPAP 24/10 and 100% FiO2 with O2 sats of 92%. Continue O2 support with weaning per RT and COVID-19 protocol. High intensity Decadron per Covid protocol started the 20 mg IV x5 days then 10 mg IV x5 days. Started on baricitinib on admission  Procalcitonin at 0.2 with low probability of superimposed bacterial pneumonia. Vitamin D, vitamin C, and zinc.  Continue O2 support with weaning per RT and COVID-19 protocol  Encourage IS and acapella as tolerated     Sepsis secondary to above  Treatment as above     Chest pain r/o NSTEMI  No prior known history of coronary artery disease although risk factors including hypertension and hyperlipidemia. Highest troponin was 2.1 at [facility] on 12/21/2021 at 12:20 PM, EKG showed sinus bradycardia, non specific EKG changes  Continue heparin and nitroglycerin gtt. for now  Continue aggressive guideline directed medical therapy with aspirin, Plavix, losartan, metoprolol, and statin. Hemoglobin A1c and lipid profile in a.m. for further cardiac risk stratification  Patient of cardiologist Dr. Yesenia Ford from [facility].   Cardiology consultation for possible PCI, unstable to do cath at this time due to desaturations  Echo pending    Essential Hypertension  Started in Nitro drip  Holding home dose hydrochlorothiazide for now. Continue metoprolol 12.5 twice daily. Resume home dose losartan with parameters.     Hyperlipidemia  Lipid profile in a.m. and consider statin therapy.     Hypothyroidism  Check TSH in a.m. Resume home dose levothyroxine.     DVT prophylaxis:  heparin GTT     Chief Complaint:   SOB; NSTEMI    Hospital Course:   Per admission H&P:    David Groves is a 46 y.o. male with PMHx of hypertension, hyperlipidemia, renal calculi, irritable bowel syndrome, hypothyroidism, who was transferred as a direct admit to [address] [place] [address] on 12/21/2021 from [facility] after he was hospitalized over there on 12/15/2021 with acute hypoxemic respiratory failure secondary to COVID-19 pneumonia then developed an NSTEMI on 12/21/2021 with acute pressure-like chest pain. Patient was transferred upon the recommendations of cardiology     Apparently patient started having symptoms on 12/7/2021 consisting of fatigue, decreased energy, decreased appetite, weakness, and high fevers of 102.6 °F and presented to [facility] on 12/15/2021 which was the date when his COVID-23 PCR was found to be positive. Patient is unvaccinated against COVID-19. He was found to be hypoxemic down to 84% on room air. CT angiogram of the chest on admission showed multifocal pneumonia without evidence of PE. Initiated on Decadron and remdesivir and at least one dose of azithromycin from the emergency room. Patient seems to have completed remdesivir treatment with first dose on 12/15. No further antibiotics were then given.     Patient unfortunately continued to progressively worsen requiring more oxygen support reaching high settings on BiPAP.   On the evening of 12/20/2021 patient had some rhythm changes on telemetry and troponin levels returned back initially mildly elevated at 0.5 (reference less than 0.034), followed by an elevated level of 0.76 although patient remained initially chest pain-free or asymptomatic. Patient was initiated on IV nitroglycerin drip as well as heparin drip and cardiology were consulted. This morning 12/21 patient reported mild tightness in his chest while on BiPAP and an initial EKG only showed some peaked T waves and no acute ischemic or ST segment changes.       Cardiology Dr. Blanca Miller evaluated patient in consultation today 12/21 and stated that patient will likely need cardiac catheterization once more stable and recommended starting the patient on aggressive guideline directed optimal medical therapy including aspirin and beta-blocker, nitrates, Plavix and statins. (Patient previously on losartan combined with hydrochlorothiazide 100/12.5, levothyroxine levothyroxine 100, Norvasc 10).    Finally this afternoon, patient's third troponin levels returned back elevated at more than 2 and a repeat EKG was obtained showing questionable mild ST segment changes concerning for possible ST elevation in the inferior leads. Patient was then emergently transferred by EMS to our facility as a code STEMI by hospitalist at [facility] However, code STEMI was canceled in route by cardiologist.     Upon patient's discharge from [facility] to our facility blood pressure 112/69 with a respiratory of 24 on BiPAP with 94% O2 sats.       Lab work at [facility] [facility] showed a troponin of 2.1 from today 12/21/2021 at 12:20 PM which was up from 0.76, D-dimer at 0.71, WBC count of 12.8, hemoglobin 15.2, hematocrit 43.20 platelet count of 557. Glucose 112, sodium 133, potassium 4.3, chloride 103, bicarb 24, glucose 112, BUN of 19 and a creatinine of 0.7. LFTs within normal limits.   Twelve-lead EKG from today 12/21/2021 normal sinus rhythm with right bundle base block but no acute ST segment changes.     At the time of evaluation on the Covid unit patient was laying in bed with the BiPAP vehicle very anxious complaining about the humidity and has a pressure of his room and wanting to decrease the thermostat setting. He was in a critical state of respiratory failure requiring high or maximum BiPAP settings of 24/10 to keep his O2 sats in the low 90s. His O2 sats easily drops to the 80s with minimal motion or movement on the bed or even while attempting to speak.     Discussed CODE STATUS and his wishes regarding invasive ventilatory support to meet his oxygen demands if BiPAP fails and patient confirms his decision to be a full code and to be given the benefit of ventilatory support if he ever gets to the point where he needs it. Subjective:   Patient seen and examined, on max settings of BIPAP, with intermittent hypoxia noted on repositioning. BP stable, afebrile. Cardiology was consulted for possible cath, however, unstable at this time.      Medications:  Reviewed    Infusion Medications    nitroGLYCERIN 5 mcg/min (12/22/21 0942)    sodium chloride       Scheduled Medications    losartan  25 mg Oral Daily    atorvastatin  40 mg Oral Nightly    enoxaparin  1 mg/kg SubCUTAneous Q12H    levothyroxine  50 mcg Oral Daily    zinc sulfate  50 mg Oral Daily    metoprolol tartrate  12.5 mg Oral Q6H    clopidogrel  75 mg Oral Daily    aspirin  81 mg Oral Daily    ascorbic acid  1,000 mg Oral Daily    sodium chloride flush  5-40 mL IntraVENous 2 times per day    dexamethasone  20 mg IntraVENous Q24H    Followed by   Blayne Barriga ON 12/26/2021] dexamethasone  10 mg IntraVENous Q24H    baricitinib  4 mg Oral Daily    Vitamin D  2,000 Units Oral Daily     PRN Meds: benzonatate, LORazepam, sodium chloride flush, sodium chloride, ondansetron **OR** ondansetron, polyethylene glycol, acetaminophen **OR** acetaminophen, benzocaine-menthol, albuterol sulfate HFA      Intake/Output Summary (Last 24 hours) at 12/22/2021 1447  Last data filed at 12/22/2021 0400  Gross per 24 hour   Intake --   Output 400 ml   Net -400 ml       Diet:  Diet NPO    Exam:  BP 103/63   Pulse 58   Temp 98.1 °F (36.7 °C) (Axillary)   Resp 12   Ht 5' 10\" (1.778 m)   Wt 195 lb (88.5 kg)   SpO2 94%   BMI 27.98 kg/m²     General appearance: No apparent distress at rest, appears stated age and cooperative. On BIPAP  HEENT: Pupils equal, round, and reactive to light. Conjunctivae/corneas clear. Neck: Supple, with full range of motion. No jugular venous distention. Trachea midline. Respiratory:  Normal respiratory effort. Diminished BS bilaterally without Rales/Wheezes/Rhonchi. Cardiovascular: Regular rate and rhythm with normal S1/S2 without murmurs, rubs or gallops. Abdomen: Soft, non-tender, non-distended with normal bowel sounds. Musculoskeletal: passive and active ROM x 4 extremities. Skin: Skin color, texture, turgor normal.  No rashes or lesions. Neurologic:  Neurovascularly intact without any focal sensory/motor deficits. Cranial nerves: II-XII intact, grossly non-focal.  Psychiatric: Alert and oriented, thought content appropriate, normal insight  Capillary Refill: Brisk,< 3 seconds   Peripheral Pulses: +2 palpable, equal bilaterally       Labs:   Recent Labs     12/21/21 1951 12/22/21  0406   WBC 11.5* 10.5   HGB 14.9 14.8   HCT 42.7 44.3    347     Recent Labs     12/21/21 1951 12/22/21  0406   * 134*   K 4.8 5.0    99   CO2 19* 22*   BUN 20 23*   CREATININE 0.8 0.8   CALCIUM 8.1* 8.3*     No results for input(s): AST, ALT, BILIDIR, BILITOT, ALKPHOS in the last 72 hours. No results for input(s): INR in the last 72 hours. No results for input(s): Bojorquez Sol in the last 72 hours. Urinalysis:    No results found for: Segun Henle, BACTERIA, RBCUA, BLOODU, SPECGRAV, Maureen [place] [address]    Radiology:  XR CHEST PORTABLE   Final Result   1. Normal heart size. No effusion. 2. Moderate groundglass infiltrates scattered throughout both lungs, consistent with interstitial pneumonia/edema.             **This report has been created using voice recognition software. It may contain minor errors which are inherent in voice recognition technology. **      Final report electronically signed by Dr. Raghavendra Dyer on 12/22/2021 10:49 AM          Diet: Diet NPO    DVT prophylaxis: [] Lovenox                                 [] SCDs                                 [] SQ Heparin                                 [] Encourage ambulation           [] Already on Anticoagulation     Disposition:    [] Home       [] TCU       [] Rehab       [] Psych       [] SNF       [] [place]       [] Other-    Code Status: Full Code    PT/OT Eval Status:         Electronically signed by Calebamara Morillo MD on 12/22/2021 at 2:47 PM 06-Jun-2021 17:30

## 2021-12-22 NOTE — PROGRESS NOTES
7812: Phone call received from campbell Maya. Updated on patient status through out the night. All questions and concerns addressed. 1315: Called campbell Maya and updated her that cardiology decided to wait on heart cath due to patients respiratory status. All questions addressed. Reassured patient that would call again this evening with update.

## 2021-12-22 NOTE — CARE COORDINATION
12/22/21, 7:32 AM EST  DISCHARGE PLANNING EVALUATION:    Isabelle Torres       Admitted: 12/21/2021/ [facility] day: 1   Location: [facility]/032-A Reason for admit: NSTEMI (non-ST elevated myocardial infarction) ([facility] [address] [I21.4]  Acute hypoxemic respiratory failure due to COVID-19 ([facility] [address] [U07.1, J96.01]   PMH:  has no past medical history on file. Procedure: 12/22 Echo to be done  Barriers to Discharge:  Admitted as direct admit from [facility] with COVID, requiring high flow O2/BiPAP. Troponins trending up and eventually will need heart cath. Consulting Cardiology. Troponins 0.316 and 0.293. CRP 6.63. Ferritin 2564. Vit C, Baby ASA, Baricitinib 4mg po daily, starting Plavix today, heparin gtt, Synthroid, Cozaar, Lopressor q6hr, Vit D, Zinc, Lipitor, Decadron 20mg iv daily. Incentive spirometry. Acapella. BiPAP at 100% O2. PCP: Evette Fischer  Readmission Risk Score: 5.6 ( )%    Patient Goals/Plan/Treatment Preferences: Spoke with pt. He lives at home alone. He is independent and drives, he works full time. Denies any DME or HH. Current with his PCP. Monitor for home health and Home O2 needs. Transportation/Food Security/Housekeeping Addressed:  No issues identified.

## 2021-12-22 NOTE — CONSULTS
Acute hypoxic resp Failure  Covid 19 PNA  Elevated troponin  Chest pain  Abn ekg  nstemi  HTN  HLP  Hypothyroidism    plan    Echo  Med RX for now  Ischemia work up when stable  Cath   cxr  BNP      The risk and benefit of left heart cath has been explain in detail including but not limited to  Bleeding including retroperitoneal bleed 1%, infection, MI, CVA, KIT, Limb loss, dissection, allergic reaction,death  Each of them 1 in 2000 range. The alternative managment has been explained. Patient expressed understanding of the risk and benefit and of the alternative managment well. Patient wanted and agreed to proceed with left heart cath.   Hence we will schedule him for [address] [place] [address]       [phone]    Aleah Aguayo MD

## 2021-12-22 NOTE — PROGRESS NOTES
Spoke to daughter Parish Talamantes on the phone and updated her on patient. Let her know that she should be called around 6am and 6pm with updates on her dad. All questions and concerns addressed.

## 2021-12-22 NOTE — H&P
Hospitalist History and Physical          Patient: Alyssa Wray  : 1970  MRN: [id number]     Acct: [de-identified]    PCP: Keila Rodríguez  Date of Admission: 2021  Date of Service: Pt seen/examined on 21  and Admitted to Inpatient with expected LOS greater than two midnights due to medical therapy. Hospital Problems           Last Modified POA    NSTEMI (non-ST elevated myocardial infarction) ([facility] [address] 2021 Yes    Acute hypoxemic respiratory failure due to COVID-19 [facility]) 2021 Yes          Assessment and Plan:    Acute hypoxic respiratory failure:  Secondary to COVID-19 pneumonia. Requiring high O2 support through BiPAP with 90% FiO2 and 24/10 pressures to maintain O2 sats in the 90 to 92% range. Continue O2 support with weaning per RT and COVID-19 protocol. Treat underlying causes as detailed below. Sepsis:  Patient with tachycardia, tachypnea, leukocytosis, and fever of 102 on early presentation with an identified source of infection, meeting 4 out of 4 SIRS criteria for sepsis due to Covid pneumonia. Treat underlying cause as detailed below. COVID-19 pneumonia:  Unvaccinated. Symptoms started 2021. COVID-19 PCR positive on 12/15/2021. Currently requiring high O2 support with BiPAP 24/10 and 100% FiO2 with O2 sats of 92%. Continue O2 support with weaning per RT and COVID-19 protocol. High intensity Decadron per Covid protocol started the 20 mg IV x5 days then 10 mg IV x5 days. Inflammatory markers show a CRP of 6.6, , ferritin 2564, and D-dimer of 535. Started on baricitinib with pharmacy consultation for dosing. Procalcitonin at 0.2 with low probability of contaminant bacterial pneumonia. Holding off on initiating antibiotics for now. Symptomatic and supportive care with antiemetics, antipyretics, antitussives, mucolytic's, antidiarrheals, and bronchodilators.   Vitamin D, vitamin C, and zinc.  Aqupla device and incentive spirometer as frequent as possible. NSTEMI:  No prior known history of coronary artery disease although risk factors including hypertension and hyperlipidemia. Highest troponin was 2.1 at [facility] on 12/21/2021 at 12:20 PM.  Continue heparin and nitroglycerin gtt. for now and wean down nitroglycerin as tolerated keeping patient chest pain-free. Continue to trend troponins, EKG in a.m., telemetry monitoring. Continue aggressive guideline directed medical therapy with aspirin, Plavix, losartan, metoprolol, and statin. Hemoglobin A1c and lipid profile in a.m. for further cardiac risk stratification. As needed sublingual nitro and morphine for chest pain once nitro gtt. discontinued. Patient of cardiologist Dr. Mariaelena Rose from [facility]. Echocardiogram in a.m. Cardiology consultation for possible PCI. Hypertension:  Well-controlled now she is on nitro drip. Holding home dose hydrochlorothiazide for now. Continue newly initiated metoprolol 12.5 twice daily. Resume home dose losartan with parameters. Hyperlipidemia:  Lipid profile in a.m. and consider statin therapy. Hypothyroidism:  Check TSH in a.m. Resume home dose levothyroxine. DVT prophylaxis:  Already on heparin GTT. Fluid/electrolyte/nutrition:  Saline lock IV fluids. Electrolytes within normal limits. Check magnesium. N.p.o. for now except for ice chips and water sips with meds      =======================================================================      Chief Complaint: Acute respiratory failure, and NSTEMI.     History Of Present Illness:  Lorie Hernandez is a 46 y.o. male with PMHx of hypertension, hyperlipidemia, renal calculi, irritable bowel syndrome, hypothyroidism, who was transferred as a direct admit to [address] [place] [address] on 12/21/2021 from [facility] after he was hospitalized over there on 12/15/2021 with acute hypoxemic respiratory failure secondary to COVID-19 pneumonia then developed an NSTEMI on 12/21/2021 with acute pressure-like chest pain. Patient was transferred upon the recommendations of cardiology    Apparently patient started having symptoms on 12/7/2021 consisting of fatigue, decreased energy, decreased appetite, weakness, and high fevers of 102.6 °F and presented to [facility] on 12/15/2021 which was the date when his COVID-23 PCR was found to be positive. Patient is unvaccinated against COVID-19. He was found to be hypoxemic down to 84% on room air. CT angiogram of the chest on admission showed multifocal pneumonia without evidence of PE. Initiated on Decadron and remdesivir and at least one dose of azithromycin from the emergency room. Patient seems to have completed remdesivir treatment with first dose on 12/15. No further antibiotics were then given. Patient unfortunately continued to progressively worsen requiring more oxygen support reaching high settings on BiPAP. On the evening of 12/20/2021 patient had some rhythm changes on telemetry and troponin levels returned back initially mildly elevated at 0.5 (reference less than 0.034), followed by an elevated level of 0.76 although patient remained initially chest pain-free or asymptomatic. Patient was initiated on IV nitroglycerin drip as well as heparin drip and cardiology were consulted. This morning 12/21 patient reported mild tightness in his chest while on BiPAP and an initial EKG only showed some peaked T waves and no acute ischemic or ST segment changes. Cardiology Dr. Kristie De Santiago evaluated patient in consultation today 12/21 and stated that patient will likely need cardiac catheterization once more stable and recommended starting the patient on aggressive guideline directed optimal medical therapy including aspirin and beta-blocker, nitrates, Plavix and statins. (Patient previously on losartan combined with hydrochlorothiazide 100/12.5, levothyroxine levothyroxine 100, Norvasc 10).     Finally this afternoon, patient's third troponin levels returned back elevated at more than 2 and a repeat EKG was obtained showing questionable mild ST segment changes concerning for possible ST elevation in the inferior leads. Patient was then emergently transferred by EMS to our facility as a code STEMI by hospitalist at [facility] ([facility] However, code STEMI was canceled in route by cardiologist.    Upon patient's discharge from [facility] to our facility blood pressure 112/69 with a respiratory of 24 on BiPAP with 94% O2 sats. Lab work at [facility] showed a troponin of 2.1 from today 12/21/2021 at 12:20 PM which was up from 0.76, D-dimer at 0.71, WBC count of 12.8, hemoglobin 15.2, hematocrit 43.20 platelet count of 597. Glucose 112, sodium 133, potassium 4.3, chloride 103, bicarb 24, glucose 112, BUN of 19 and a creatinine of 0.7. LFTs within normal limits. Twelve-lead EKG from today 12/21/2021 normal sinus rhythm with right bundle base block but no acute ST segment changes. At the time of evaluation on the Covid unit patient was laying in bed with the BiPAP vehicle very anxious complaining about the humidity and has a pressure of his room and wanting to decrease the thermostat setting. He was in a critical state of respiratory failure requiring high or maximum BiPAP settings of 24/10 to keep his O2 sats in the low 90s. His O2 sats easily drops to the 80s with minimal motion or movement on the bed or even while attempting to speak. Discussed CODE STATUS and his wishes regarding invasive ventilatory support to meet his oxygen demands if BiPAP fails and patient confirms his decision to be a full code and to be given the benefit of ventilatory support if he ever gets to the point where he needs it. Discussed CODE STATUS with him at the bedside and his desires      Past Medical History:    No past medical history on file. Past Surgical History:    No past surgical history on file.     Medications Prior to Admission:   Prior to Admission medications    Medication Sig Start Date End Date Taking? Authorizing Provider   amLODIPine (NORVASC) 10 MG tablet Take 10 mg by mouth daily   Yes Historical Provider, MD   levothyroxine (SYNTHROID) 100 MCG tablet Take 100 mcg by mouth Daily   Yes Historical Provider, MD   losartan (COZAAR) 50 MG tablet Take 50 mg by mouth nightly   Yes Historical Provider, MD       Allergies:  Patient has no allergy information on record. Social History:    The patient currently lives independently  Tobacco use:   has no history on file for tobacco use. Alcohol use:   has no history on file for alcohol use. Drug use:  has no history on file for drug use. Family History:   as follows:  No family history on file. Review of Systems:   Pertinent positives and negatives as noted in the HPI. All other systems reviewed and negative. Physical Exam:    /74   Pulse 59   Temp 99.9 °F (37.7 °C) (Axillary)   Resp 20   Ht 5' 10\" (1.778 m)   Wt 195 lb (88.5 kg)   SpO2 94%   BMI 27.98 kg/m²       General appearance: No apparent distress, well developed, appears stated age. Eyes:  Conjunctivae/corneas clear. HENT: Head normal in appearance. External nares normal.  Oral mucosa moist without lesions. Hearing grossly intact. Neck: Supple, with full range of motion. Trachea midline. No gross JVD appreciated. Respiratory: Diffuse inspiratory crackles throughout both lung fields bilaterally with no wheezing, no rhonchi, positive mild subcostal retractions, no accessory muscle use. Cardiovascular: Normal rate, regular rhythm with normal S1/S2 without murmurs. No lower extremity edema. Abdomen: Soft, non-tender, non-distended with normal bowel sounds. Musculoskeletal: There is no joint swelling or tenderness. Normal tone. No abnormal movements. Skin: Warm and dry. No rashes or lesions. Neurologic:  No focal sensory/motor deficits in the upper and lower extremities.  Cranial nerves:  grossly non-focal 2-12.     Psychiatric: Alert and oriented, normal insight and thought content. Capillary Refill: Brisk,< 3 seconds. Peripheral Pulses: +2 palpable, equal bilaterally. Labs:     Recent Labs     12/21/21 1951   WBC 11.5*   HGB 14.9   HCT 42.7        Recent Labs     12/21/21 1951   *   K 4.8      CO2 19*   BUN 20   CREATININE 0.8   CALCIUM 8.1*     No results for input(s): AST, ALT, BILIDIR, BILITOT, ALKPHOS in the last 72 hours. No results for input(s): INR in the last 72 hours. No results for input(s): Evonnie Montane in the last 72 hours. No results found for: Girish Barrios, JENNIFER, [address] [place] [address], [place] [address], [facility]      Radiology:     No orders to display          EKG:  I have reviewed the EKG with the following interpretation: Pending at the moment but previous EKG with normal sinus rhythm and no acute ischemic or ST segment changes. PT/OT Eval Status:  will be assessed  Diet: Diet NPO  DVT prophylaxis: On heparin gtt. Code Status: Full Code  Disposition: admit to stepdown Covid unit, inpatient, with telemetry. Thank you [facility] for the opportunity to be involved in this patient's care.     Electronically signed by Kim Solorzano MD on 12/22/2021 at 4:37 AM.

## 2021-12-23 NOTE — PROGRESS NOTES
Hospitalist Progress Note    Patient:  Emma Solano      Unit/Bed:3B-32/032-A    YOB: 1970    MRN: [id number]       Acct: [de-identified]     PCP: Jenise Nunez    Date of Admission: 12/21/2021    Assessment/Plan:    Anticipated Discharge in :     MARCUS/Angel Luis Pierson [address] Problems    Diagnosis Date Noted    NSTEMI (non-ST elevated myocardial infarction) ([facility] [address] [I21.4] 12/21/2021    Acute hypoxemic respiratory failure due to COVID-19 ([facility] [address] [U07.1, J96.01] 12/21/2021       Acute hypoxic respiratory failure secondary to COVID-19 pneumonia, unvaccinated  Requiring high O2 support through BiPAP on max settings  Onset of symptoms 12/7/2021. COVID-19 PCR positive on 12/15/2021. Currently requiring high O2 support with BiPAP 24/10 and 100% FiO2 with O2 sats of 92%. Continue O2 support with weaning per RT and COVID-19 protocol. High intensity Decadron per Covid protocol started the 20 mg IV x5 days then 10 mg IV x5 days. Started on baricitinib on admission  Procalcitonin at 0.2 with low probability of superimposed bacterial pneumonia. Vitamin D, vitamin C, and zinc.  Continue O2 support with weaning per RT and COVID-19 protocol  Encourage IS and acapella as tolerated     Sepsis secondary to above  Treatment as above     Chest pain r/o NSTEMI  No prior known history of coronary artery disease although risk factors including hypertension and hyperlipidemia. Highest troponin was 2.1 at [facility] on 12/21/2021 at 12:20 PM, EKG showed sinus bradycardia, non specific EKG changes  Continue heparin and nitroglycerin gtt. for now  Continue aggressive guideline directed medical therapy with aspirin, Plavix, losartan, metoprolol, and statin. Hemoglobin A1c and lipid profile in a.m. for further cardiac risk stratification  Patient of cardiologist Dr. Piper Bennett from [facility].   Cardiology consultation for possible PCI, unstable to do cath at this time due to desaturations  Echo pending    Essential Hypertension  Started in Nitro drip on admission  Holding home dose hydrochlorothiazide for now. Continue metoprolol 12.5 q6  Losartan held     Hyperlipidemia  Lipid profile in a.m. and consider statin therapy.     Hypothyroidism  Resume home dose levothyroxine.     DVT prophylaxis:  heparin GTT     Chief Complaint:   SOB; NSTEMI    Hospital Course:   Per admission H&P:    Max Swanson is a 46 y.o. male with PMHx of hypertension, hyperlipidemia, renal calculi, irritable bowel syndrome, hypothyroidism, who was transferred as a direct admit to [facility] on 12/21/2021 from [facility] after he was hospitalized over there on 12/15/2021 with acute hypoxemic respiratory failure secondary to COVID-19 pneumonia then developed an NSTEMI on 12/21/2021 with acute pressure-like chest pain. Patient was transferred upon the recommendations of cardiology     Apparently patient started having symptoms on 12/7/2021 consisting of fatigue, decreased energy, decreased appetite, weakness, and high fevers of 102.6 °F and presented to [facility] on 12/15/2021 which was the date when his COVID-23 PCR was found to be positive. Patient is unvaccinated against COVID-19. He was found to be hypoxemic down to 84% on room air. CT angiogram of the chest on admission showed multifocal pneumonia without evidence of PE. Initiated on Decadron and remdesivir and at least one dose of azithromycin from the emergency room. Patient seems to have completed remdesivir treatment with first dose on 12/15. No further antibiotics were then given.     Patient unfortunately continued to progressively worsen requiring more oxygen support reaching high settings on BiPAP.   On the evening of 12/20/2021 patient had some rhythm changes on telemetry and troponin levels returned back initially mildly elevated at 0.5 (reference less than 0.034), followed by an elevated level of 0.76 although patient remained initially chest pain-free or asymptomatic. Patient was initiated on IV nitroglycerin drip as well as heparin drip and cardiology were consulted. This morning 12/21 patient reported mild tightness in his chest while on BiPAP and an initial EKG only showed some peaked T waves and no acute ischemic or ST segment changes.       Cardiology Dr. Zander Horta evaluated patient in consultation today 12/21 and stated that patient will likely need cardiac catheterization once more stable and recommended starting the patient on aggressive guideline directed optimal medical therapy including aspirin and beta-blocker, nitrates, Plavix and statins. (Patient previously on losartan combined with hydrochlorothiazide 100/12.5, levothyroxine levothyroxine 100, Norvasc 10).    Finally this afternoon, patient's third troponin levels returned back elevated at more than 2 and a repeat EKG was obtained showing questionable mild ST segment changes concerning for possible ST elevation in the inferior leads. Patient was then emergently transferred by EMS to our facility as a code STEMI by hospitalist at [facility] ([facility]). However, code STEMI was canceled in route by cardiologist.     Upon patient's discharge from [facility] to our facility blood pressure 112/69 with a respiratory of 24 on BiPAP with 94% O2 sats.       Lab work at [facility] showed a troponin of 2.1 from today 12/21/2021 at 12:20 PM which was up from 0.76, D-dimer at 0.71, WBC count of 12.8, hemoglobin 15.2, hematocrit 43.20 platelet count of 549. Glucose 112, sodium 133, potassium 4.3, chloride 103, bicarb 24, glucose 112, BUN of 19 and a creatinine of 0.7. LFTs within normal limits.   Twelve-lead EKG from today 12/21/2021 normal sinus rhythm with right bundle base block but no acute ST segment changes.     At the time of evaluation on the Covid unit patient was laying in bed with the BiPAP vehicle very anxious complaining about the humidity and has a pressure of his room and wanting to decrease the thermostat setting. He was in a critical state of respiratory failure requiring high or maximum BiPAP settings of 24/10 to keep his O2 sats in the low 90s. His O2 sats easily drops to the 80s with minimal motion or movement on the bed or even while attempting to speak.     Discussed CODE STATUS and his wishes regarding invasive ventilatory support to meet his oxygen demands if BiPAP fails and patient confirms his decision to be a full code and to be given the benefit of ventilatory support if he ever gets to the point where he needs it. Subjective:   Patient seen and examined, on max settings of BIPAP, with intermittent hypoxia noted on repositioning. BP stable, afebrile. Still complains of intermittent chest pain.        Medications:  Reviewed    Infusion Medications    nitroGLYCERIN 5 mcg/min (12/22/21 0942)    sodium chloride       Scheduled Medications    losartan  25 mg Oral Daily    atorvastatin  40 mg Oral Nightly    enoxaparin  1 mg/kg SubCUTAneous Q12H    levothyroxine  50 mcg Oral Daily    zinc sulfate  50 mg Oral Daily    metoprolol tartrate  12.5 mg Oral Q6H    clopidogrel  75 mg Oral Daily    aspirin  81 mg Oral Daily    ascorbic acid  1,000 mg Oral Daily    sodium chloride flush  5-40 mL IntraVENous 2 times per day    dexamethasone  20 mg IntraVENous Q24H    Followed by   Ryan Cohen ON 12/26/2021] dexamethasone  10 mg IntraVENous Q24H    baricitinib  4 mg Oral Daily    Vitamin D  2,000 Units Oral Daily     PRN Meds: benzonatate, LORazepam, sodium chloride flush, sodium chloride, ondansetron **OR** ondansetron, polyethylene glycol, acetaminophen **OR** acetaminophen, benzocaine-menthol, albuterol sulfate HFA      Intake/Output Summary (Last 24 hours) at 12/23/2021 0750  Last data filed at 12/22/2021 2135  Gross per 24 hour   Intake --   Output 925 ml   Net -925 ml       Diet:  Diet NPO    Exam:  /61   Pulse 57   Temp 97.9 °F (36.6 °C) (Axillary)   Resp 19   Ht 5' 10\" (1.778 m)   Wt 195 lb (88.5 kg)   SpO2 94%   BMI 27.98 kg/m²     General appearance: No apparent distress at rest, appears stated age and cooperative. On BIPAP  HEENT: Pupils equal, round, and reactive to light. Conjunctivae/corneas clear. Neck: Supple, with full range of motion. No jugular venous distention. Trachea midline. Respiratory:  Normal respiratory effort. Diminished BS bilaterally without Rales/Wheezes/Rhonchi. Cardiovascular: Regular rate and rhythm with normal S1/S2 without murmurs, rubs or gallops. Abdomen: Soft, non-tender, non-distended with normal bowel sounds. Musculoskeletal: passive and active ROM x 4 extremities. Skin: Skin color, texture, turgor normal.  No rashes or lesions. Neurologic:  Neurovascularly intact without any focal sensory/motor deficits. Cranial nerves: II-XII intact, grossly non-focal.  Psychiatric: Alert and oriented, thought content appropriate, normal insight  Capillary Refill: Brisk,< 3 seconds   Peripheral Pulses: +2 palpable, equal bilaterally       Labs:   Recent Labs     12/21/21 1951 12/22/21  0406 12/23/21  0333   WBC 11.5* 10.5 14.2*   HGB 14.9 14.8 15.3   HCT 42.7 44.3 44.2    347 385     Recent Labs     12/21/21 1951 12/22/21 0406 12/23/21  0333   * 134* 138   K 4.8 5.0 5.0    99 103   CO2 19* 22* 19*   BUN 20 23* 29*   CREATININE 0.8 0.8 0.7   CALCIUM 8.1* 8.3* 8.2*     No results for input(s): AST, ALT, BILIDIR, BILITOT, ALKPHOS in the last 72 hours. No results for input(s): INR in the last 72 hours. No results for input(s): Kesiha Ben in the last 72 hours. Urinalysis:    No results found for: Torres Bridegroom, BACTERIA, RBCUA, BLOODU, SPECGRAV, Maureen [place] [address]    Radiology:  XR CHEST PORTABLE   Final Result   1. Normal heart size. No effusion. 2. Moderate groundglass infiltrates scattered throughout both lungs, consistent with interstitial pneumonia/edema.             **This report has been created using voice recognition software. It may contain minor errors which are inherent in voice recognition technology. **      Final report electronically signed by Dr. Alyssa Street on 12/22/2021 10:49 AM          Diet: Diet NPO    DVT prophylaxis: [] Lovenox                                 [] SCDs                                 [] SQ Heparin                                 [] Encourage ambulation           [] Already on Anticoagulation     Disposition:    [] Home       [] TCU       [] Rehab       [] Psych       [] SNF       [] [place]       [] Other-    Code Status: Full Code    PT/OT Eval Status:         Electronically signed by Jordy Tejeda MD on 12/23/2021 at 7:50 AM

## 2021-12-23 NOTE — PROGRESS NOTES
Cardiology Progress Note      Patient:  Bozena Mason  YOB: 1970  MRN: [id number]   Acct: [de-identified]  Admit Date:  12/21/2021  Primary Cardiologist: none  Seen by Dr. Andre Sinclair     Per prior cardiology consult note-  REASON FOR CONSULTATION:  Acute coronary syndrome in 75-year-old  gentleman, admitted with COVID-19 pneumonia and hypoxic respiratory  failure on BiPAP, 100% oxygen.     HISTORY OF PRESENT ILLNESS:  This is actually a 75-year-old   gentleman with past medical history of hypertension, hyperlipidemia,  renal stone, irritable bowel syndrome, hypothyroidism, transferred to  [address] [place] [address] on 12/21/2021 from [facility] because of chest pain and troponin elevation and presumed to be  non-ST-elevation myocardial infarction. The patient has been  hospitalized in [facility] starting from 12/15/2021 for hypoxic respiratory  failure secondary to COVID-19 pneumonia and so yesterday on 12/21/2021,  the patient started to have chest pain, retrosternal pressure, heaviness  like, 5/10 radiating, no associated sweating or diaphoresis, but  persisted for almost over an hour until he started a nitroglycerin drip. With the nitroglycerin drip, the chest pain resolved and he has been  chest pain free and this morning has been off nitro and has still been  chest pain free. The patient is still in hypoxic respiratory failure,  on BiPAP with 100% oxygen requirement to saturate 91-92%.     The patient has a history of signs and symptoms of viral infection,  apparently generalized body weakness and fever since 12/07 and got  admitted on 12/15 at [facility] after COVID-19 PCR was found to be  positive and he was hypoxic at that time saturating 84% at room air. CT  of the chest showed on admission multifocal pneumonia without evidence  of pulmonary thromboembolism, was then started on Decadron, remdesivir,  azithromycin.   The patient completed the course of remdesivir, the first  dose was 12/15. Progressively, he is short of breath, got worse and the  patient required BiPAP and so troponin initially was mildly elevated to  0.5 and subsequently went up to 0.76 at [facility] and the patient has been  chest pain free initially and until yesterday when he started to have  chest pain and required some nitroglycerin drip and EKG showed  nonspecific ST-T segment abnormality. Cardiologist at [facility],  Dr. Calderon Vanessa has been consulted and evaluated the patient and recommended  cardiac catheterization and the patient transferred to our medical  center.       Subjective (Events in last 24 hours):     Pt remains in %   He states intermittent Chest pain over \"heart\" - no radiation   VSS  Tele SR no ectopy   EKG no ischemic changes     pt understands the need to wait for cath at present (DT resp failure) unless hemodynamic unstable      Pt not receiving BB - parameters adjusted     Objective:   /61   Pulse 57   Temp 97.9 °F (36.6 °C) (Axillary)   Resp 19   Ht 5' 10\" (1.778 m)   Wt 195 lb (88.5 kg)   SpO2 94%   BMI 27.98 kg/m²        TELEMETRY: SR no ectopy    Physical Exam:  General Appearance: alert and oriented to person, place and time, in no acute distress  Cardiovascular: normal rate, regular rhythm, normal S1 and S2, no murmurs, rubs, clicks, or gallops, distal pulses intact,  Pulmonary/Chest: clear upper - diminished lower to auscultation bilaterally- no wheezes, rales or rhonchi, normal air movement, no respiratory distress  Abdomen: soft, non-tender, non-distended, normal bowel sounds, no masses Extremities: no cyanosis, clubbing or edema, pulses present   Musculoskeletal: normal range of motion, no joint swelling, deformity or tenderness  Neurological: alert, oriented, normal speech, no focal findings or movement disorder noted    Medications:    losartan  25 mg Oral Daily    atorvastatin  40 mg Oral Nightly    enoxaparin  1 mg/kg SubCUTAneous Q12H    levothyroxine 50 mcg Oral Daily    zinc sulfate  50 mg Oral Daily    metoprolol tartrate  12.5 mg Oral Q6H    clopidogrel  75 mg Oral Daily    aspirin  81 mg Oral Daily    ascorbic acid  1,000 mg Oral Daily    sodium chloride flush  5-40 mL IntraVENous 2 times per day    dexamethasone  20 mg IntraVENous Q24H    Followed by   Modesto Hardin ON 12/26/2021] dexamethasone  10 mg IntraVENous Q24H    baricitinib  4 mg Oral Daily    Vitamin D  2,000 Units Oral Daily      nitroGLYCERIN 5 mcg/min (12/22/21 0942)    sodium chloride       benzonatate, 200 mg, TID PRN  LORazepam, 1 mg, Q6H PRN  sodium chloride flush, 5-40 mL, PRN  sodium chloride, 25 mL, PRN  ondansetron, 4 mg, Q8H PRN   Or  ondansetron, 4 mg, Q6H PRN  polyethylene glycol, 17 g, Daily PRN  acetaminophen, 650 mg, Q6H PRN   Or  acetaminophen, 650 mg, Q6H PRN  benzocaine-menthol, 1 lozenge, Q2H PRN  albuterol sulfate HFA, 2 puff, Q4H PRN        Diagnostics:      Echo:   Electronically signed by Ortiz Fabian MD (Interpreting   physician) on 12/22/2021 at 09:19 PM   ----------------------------------------------------------------      Findings      Mitral Valve   The mitral valve structure was normal with normal leaflet separation. DOPPLER: The transmitral velocity was within the normal range with no   evidence for mitral stenosis. Trace mitral regurgitation is present. Aortic Valve   The aortic valve was trileaflet with normal thickness and cuspal   separation. DOPPLER: Transaortic velocity was within the normal range with   no evidence of aortic stenosis. There was no evidence of aortic   regurgitation. Tricuspid Valve   The tricuspid valve structure was normal with normal leaflet separation. DOPPLER: There was no evidence of tricuspid stenosis. Mild tricuspid   regurgitation visualized. Pulmonic Valve   The pulmonic valve leaflets exhibited normal thickness, no calcification,   and normal cuspal separation.  DOPPLER: The transpulmonic velocity was within the normal range with no evidence for regurgitation. Left Atrium   Left atrial size was normal.      Left Ventricle   Normal left ventricle size and systolic function. Ejection fraction was   estimated at 65 %. There were no regional left ventricular wall motion   abnormalities and wall thickness was within normal limits. Right Atrium   Right atrial size was normal.      Right Ventricle   The right ventricular size was normal with normal systolic function and   wall thickness. Pericardial Effusion   The pericardium was normal in appearance with no evidence of a pericardial   effusion. Pleural Effusion   No evidence of pleural effusion. Aorta / Great Vessels   -Aortic root dimension within normal limits.   -The Pulmonary artery is within normal limits. -IVC size is within normal limits with normal respiratory phasic changes. Lab Data:    Cardiac Enzymes:  No results for input(s): CKTOTAL, CKMB, CKMBINDEX, TROPONINI in the last 72 hours.     CBC:   Lab Results   Component Value Date    WBC 14.2 12/23/2021    RBC 4.82 12/23/2021    HGB 15.3 12/23/2021    HCT 44.2 12/23/2021     12/23/2021       CMP:    Lab Results   Component Value Date     12/23/2021    K 5.0 12/23/2021     12/23/2021    CO2 19 12/23/2021    BUN 29 12/23/2021    CREATININE 0.7 12/23/2021    LABGLOM >90 12/23/2021    GLUCOSE 142 12/23/2021    CALCIUM 8.2 12/23/2021       Hepatic Function Panel:  No results found for: ALKPHOS, ALT, AST, PROT, BILITOT, BILIDIR, IBILI, LABALBU    Magnesium:    Lab Results   Component Value Date    MG 2.6 12/22/2021       PT/INR:  No results found for: PROTIME, INR    HgBA1c:    Lab Results   Component Value Date    LABA1C 5.8 12/22/2021       FLP:    Lab Results   Component Value Date    TRIG 71 12/22/2021    HDL 39 12/22/2021    LDLCALC 21 12/22/2021       TSH:    Lab Results   Component Value Date    TSH 0.648 12/22/2021         Assessment:    COVID 19 PNA  acute hypoxic resp failure -- on PAP     NSTEMI    Hyperkalemia - 5.0    HTN  HLP   LDL 21      Plan:  · Plan on cath once resp status stable (or if pt hemodynamically unstable)  · Continue ACS meds - please give BB - stop ARB DT hyperkalemia   · Follow          Electronically signed by DEBORA Canada CNP on 12/23/2021 at 9:15 AM

## 2021-12-23 NOTE — CARE COORDINATION
12/23/21, 12:05 PM EST    DISCHARGE ON GOING EVALUATION    HERNAN DOE [facility] day: 2  Location: -32/032-A Reason for admit: NSTEMI (non-ST elevated myocardial infarction) ([facility] [address] [I21.4]  Acute hypoxemic respiratory failure due to COVID-19 ([facility] [address] [U07.1, J96.01]   Procedure:   12/22 Echo - EF 65%. Barriers to Discharge: Afebrile. Pt is now on BiPAP at 100% FiO2. Sats 94-95%. BUN 29, creatinine 0.7. WBC 14.2. Troponin 0.100. Vit C, baby ASA, Lipitor, Baricitinib 4mg daily, Tessalon prn, Plavix, Decadron iv daily, Lovenox, Synthroid, Cozaar (held), Lopressor, Vit D, Zinc. Telemetry. Cardiology following with Hospitalist.   PCP: Amelia Block  Readmission Risk Score: 7 ( )%  Patient Goals/Plan/Treatment Preferences: Pt is from home alone. Continue to monitor for [facility] and Home O2 needs.

## 2021-12-24 NOTE — PROGRESS NOTES
Hospitalist Progress Note    Patient:  [facility]      Unit/Bed:3B-32/032-A    YOB: 1970    MRN: [id number]       Acct: [de-identified]     PCP: Gerhardt Auer    Date of Admission: 12/21/2021    Assessment/Plan:    Anticipated Discharge in :     MARCUS/Angel Luis Pierson [address] Problems    Diagnosis Date Noted    NSTEMI (non-ST elevated myocardial infarction) ([facility] [address] [I21.4] 12/21/2021    Acute hypoxemic respiratory failure due to COVID-19 ([facility] [address] [U07.1, J96.01] 12/21/2021       Acute hypoxic respiratory failure secondary to COVID-19 pneumonia, unvaccinated  Requiring high O2 support through BiPAP, weaning  Onset of symptoms 12/7/2021. COVID-19 PCR positive on 12/15/2021. Currently requiring high O2 support with BiPAP 24/10 and 100% FiO2 with O2 sats of 92%. Continue O2 support with weaning per RT and COVID-19 protocol. High intensity Decadron per Covid protocol started the 20 mg IV x5 days then 10 mg IV x5 days. Started on baricitinib on admission  Procalcitonin at 0.2 with low probability of superimposed bacterial pneumonia. Vitamin D, vitamin C, and zinc.  Continue O2 support with weaning per RT and COVID-19 protocol  Encourage IS and acapella as tolerated     Sepsis secondary to above  Treatment as above     Chest pain r/o NSTEMI  No prior known history of coronary artery disease although risk factors including hypertension and hyperlipidemia. Highest troponin was 2.1 at [facility] on 12/21/2021 at 12:20 PM, EKG showed sinus bradycardia, non specific EKG changes  Continue heparin and nitroglycerin gtt. for now  Continue aggressive guideline directed medical therapy with aspirin, Plavix, losartan, metoprolol, and statin. Hemoglobin A1c and lipid profile in a.m. for further cardiac risk stratification  Patient of cardiologist Dr. Bertha Starr from [facility].   Cardiology consultation for possible PCI, unstable to do cath at this time due to desaturations  Echo pending    Essential Hypertension  Started in Nitro drip on admission  Holding home dose hydrochlorothiazide for now. Continue metoprolol 12.5 q6, reduce to BID  Losartan held     Hyperlipidemia  Lipid profile in a.m. and consider statin therapy.     Hypothyroidism  Resume home dose levothyroxine     DVT prophylaxis:  heparin GTT     Chief Complaint:   SOB; NSTEMI    Hospital Course:   Per admission H&P:    Keron Bowens is a 46 y.o. male with PMHx of hypertension, hyperlipidemia, renal calculi, irritable bowel syndrome, hypothyroidism, who was transferred as a direct admit to [address] [place] [address] on 12/21/2021 from [facility] after he was hospitalized over there on 12/15/2021 with acute hypoxemic respiratory failure secondary to COVID-19 pneumonia then developed an NSTEMI on 12/21/2021 with acute pressure-like chest pain. Patient was transferred upon the recommendations of cardiology     Apparently patient started having symptoms on 12/7/2021 consisting of fatigue, decreased energy, decreased appetite, weakness, and high fevers of 102.6 °F and presented to [facility] on 12/15/2021 which was the date when his COVID-23 PCR was found to be positive. Patient is unvaccinated against COVID-19. He was found to be hypoxemic down to 84% on room air. CT angiogram of the chest on admission showed multifocal pneumonia without evidence of PE. Initiated on Decadron and remdesivir and at least one dose of azithromycin from the emergency room. Patient seems to have completed remdesivir treatment with first dose on 12/15. No further antibiotics were then given.     Patient unfortunately continued to progressively worsen requiring more oxygen support reaching high settings on BiPAP.   On the evening of 12/20/2021 patient had some rhythm changes on telemetry and troponin levels returned back initially mildly elevated at 0.5 (reference less than 0.034), followed by an elevated level of 0.76 although patient remained initially chest pain-free or asymptomatic. Patient was initiated on IV nitroglycerin drip as well as heparin drip and cardiology were consulted. This morning 12/21 patient reported mild tightness in his chest while on BiPAP and an initial EKG only showed some peaked T waves and no acute ischemic or ST segment changes.       Cardiology Dr. Harish Overton evaluated patient in consultation today 12/21 and stated that patient will likely need cardiac catheterization once more stable and recommended starting the patient on aggressive guideline directed optimal medical therapy including aspirin and beta-blocker, nitrates, Plavix and statins. (Patient previously on losartan combined with hydrochlorothiazide 100/12.5, levothyroxine levothyroxine 100, Norvasc 10).    Finally this afternoon, patient's third troponin levels returned back elevated at more than 2 and a repeat EKG was obtained showing questionable mild ST segment changes concerning for possible ST elevation in the inferior leads. Patient was then emergently transferred by EMS to our facility as a code STEMI by hospitalist at [facility] However, code STEMI was canceled in route by cardiologist.     Upon patient's discharge from [facility] to our facility blood pressure 112/69 with a respiratory of 24 on BiPAP with 94% O2 sats.       Lab work at [facility] showed a troponin of 2.1 from today 12/21/2021 at 12:20 PM which was up from 0.76, D-dimer at 0.71, WBC count of 12.8, hemoglobin 15.2, hematocrit 43.20 platelet count of 735. Glucose 112, sodium 133, potassium 4.3, chloride 103, bicarb 24, glucose 112, BUN of 19 and a creatinine of 0.7. LFTs within normal limits.   Twelve-lead EKG from today 12/21/2021 normal sinus rhythm with right bundle base block but no acute ST segment changes.     At the time of evaluation on the Covid unit patient was laying in bed with the BiPAP vehicle very anxious complaining about the humidity and has a pressure of his room and wanting to decrease the thermostat setting. He was in a critical state of respiratory failure requiring high or maximum BiPAP settings of 24/10 to keep his O2 sats in the low 90s. His O2 sats easily drops to the 80s with minimal motion or movement on the bed or even while attempting to speak.     Discussed CODE STATUS and his wishes regarding invasive ventilatory support to meet his oxygen demands if BiPAP fails and patient confirms his decision to be a full code and to be given the benefit of ventilatory support if he ever gets to the point where he needs it. Subjective:   Patient seen and examined, on max settings of BIPAP, tired today, but able to wean BIPAP slowly. BP stable, afebrile.        Medications:  Reviewed    Infusion Medications    nitroGLYCERIN 5 mcg/min (12/22/21 0942)    sodium chloride 25 mL (12/23/21 2222)     Scheduled Medications    [Held by provider] losartan  25 mg Oral Daily    atorvastatin  40 mg Oral Nightly    enoxaparin  1 mg/kg SubCUTAneous Q12H    levothyroxine  50 mcg Oral Daily    zinc sulfate  50 mg Oral Daily    metoprolol tartrate  12.5 mg Oral Q6H    clopidogrel  75 mg Oral Daily    aspirin  81 mg Oral Daily    ascorbic acid  1,000 mg Oral Daily    sodium chloride flush  5-40 mL IntraVENous 2 times per day    dexamethasone  20 mg IntraVENous Q24H    Followed by   Hannah Pratt ON 12/26/2021] dexamethasone  10 mg IntraVENous Q24H    baricitinib  4 mg Oral Daily    Vitamin D  2,000 Units Oral Daily     PRN Meds: benzonatate, LORazepam, sodium chloride flush, sodium chloride, ondansetron **OR** ondansetron, polyethylene glycol, acetaminophen **OR** acetaminophen, benzocaine-menthol, albuterol sulfate HFA      Intake/Output Summary (Last 24 hours) at 12/24/2021 1623  Last data filed at 12/24/2021 1452  Gross per 24 hour   Intake 100 ml   Output 1650 ml   Net -1550 ml       Diet:  Diet NPO    Exam:  /71   Pulse 53   Temp 98.9 °F (37.2 °C) (Axillary)   Resp 24 Ht 5' 10\" (1.778 m)   Wt 195 lb (88.5 kg)   SpO2 94%   BMI 27.98 kg/m²     General appearance: No apparent distress at rest, appears stated age and cooperative. On BIPAP  HEENT: Pupils equal, round, and reactive to light. Conjunctivae/corneas clear. Neck: Supple, with full range of motion. No jugular venous distention. Trachea midline. Respiratory:  Normal respiratory effort. Diminished BS bilaterally without Rales/Wheezes/Rhonchi. Cardiovascular: Regular rate and rhythm with normal S1/S2 without murmurs, rubs or gallops. Abdomen: Soft, non-tender, non-distended with normal bowel sounds. Musculoskeletal: passive and active ROM x 4 extremities. Skin: Skin color, texture, turgor normal.  No rashes or lesions. Neurologic:  Neurovascularly intact without any focal sensory/motor deficits. Cranial nerves: II-XII intact, grossly non-focal.  Psychiatric: Alert and oriented, thought content appropriate, normal insight  Capillary Refill: Brisk,< 3 seconds   Peripheral Pulses: +2 palpable, equal bilaterally       Labs:   Recent Labs     12/21/21 1951 12/22/21 0406 12/23/21  0333   WBC 11.5* 10.5 14.2*   HGB 14.9 14.8 15.3   HCT 42.7 44.3 44.2    347 385     Recent Labs     12/22/21 0406 12/23/21 0333 12/24/21  0401   * 138 133*   K 5.0 5.0 4.8   CL 99 103 101   CO2 22* 19* 19*   BUN 23* 29* 32*   CREATININE 0.8 0.7 0.7   CALCIUM 8.3* 8.2* 8.2*     No results for input(s): AST, ALT, BILIDIR, BILITOT, ALKPHOS in the last 72 hours. No results for input(s): INR in the last 72 hours. No results for input(s): Julian Min in the last 72 hours. Urinalysis:    No results found for: Heddy Lewisberry, BACTERIA, RBCUA, BLOODU, SPECGRAV, Maureen [place] [address]    Radiology:  XR CHEST PORTABLE   Final Result   1. Normal heart size. No effusion. 2. Moderate groundglass infiltrates scattered throughout both lungs, consistent with interstitial pneumonia/edema.             **This report has been created using voice recognition software. It may contain minor errors which are inherent in voice recognition technology. **      Final report electronically signed by Dr. Alyssa Street on 12/22/2021 10:49 AM          Diet: Diet NPO    DVT prophylaxis: [] Lovenox                                 [] SCDs                                 [] SQ Heparin                                 [] Encourage ambulation           [] Already on Anticoagulation     Disposition:    [] Home       [] TCU       [] Rehab       [] Psych       [] SNF       [] [place]       [] Other-    Code Status: Full Code    PT/OT Eval Status:         Electronically signed by Jordy Tejeda MD on 12/24/2021 at 4:23 PM

## 2021-12-24 NOTE — PROGRESS NOTES
Called altagracia and updated her on pt. Condition and plan of care. Answered all questions and explained visitor policy of 2 visitors between 2-4.

## 2021-12-24 NOTE — PROGRESS NOTES
Cardiology Progress Note      Patient:  Jaqueline Torres  YOB: 1970  MRN: [id number]   Acct: [de-identified]  Admit Date:  12/21/2021  Primary Cardiologist: Dr Gaye Johnson   Seen by Dr. Portillo Cedeno      Per prior cardiology consult note-  REASON FOR CONSULTATION:  Acute coronary syndrome in 75-year-old  gentleman, admitted with COVID-19 pneumonia and hypoxic respiratory  failure on BiPAP, 100% oxygen.     HISTORY OF PRESENT ILLNESS:  This is actually a 75-year-old   gentleman with past medical history of hypertension, hyperlipidemia,  renal stone, irritable bowel syndrome, hypothyroidism, transferred to  [facility] on 12/21/2021 from [facility] because of chest pain and troponin elevation and presumed to be  non-ST-elevation myocardial infarction.  The patient has been  hospitalized in [facility] starting from 12/15/2021 for hypoxic respiratory  failure secondary to COVID-19 pneumonia and so yesterday on 12/21/2021,  the patient started to have chest pain, retrosternal pressure, heaviness  like, 5/10 radiating, no associated sweating or diaphoresis, but  persisted for almost over an hour until he started a nitroglycerin drip.   With the nitroglycerin drip, the chest pain resolved and he has been  chest pain free and this morning has been off nitro and has still been  chest pain free.  The patient is still in hypoxic respiratory failure,  on BiPAP with 100% oxygen requirement to saturate 91-92%.     The patient has a history of signs and symptoms of viral infection,  apparently generalized body weakness and fever since 12/07 and got  admitted on 12/15 at [facility] after COVID-19 PCR was found to be  positive and he was hypoxic at that time saturating 84% at room air.  CT  of the chest showed on admission multifocal pneumonia without evidence  of pulmonary thromboembolism, was then started on Decadron, remdesivir,  azithromycin.  The patient completed the course of remdesivir, the first  dose was 12/15.  Progressively, he is short of breath, got worse and the  patient required BiPAP and so troponin initially was mildly elevated to  0.5 and subsequently went up to 0.76 at [facility] and the patient has been  chest pain free initially and until yesterday when he started to have  chest pain and required some nitroglycerin drip and EKG showed  nonspecific ST-T segment abnormality.  Cardiologist at [facility],  Dr. Dulce Maria Ventura has been consulted and evaluated the patient and recommended  cardiac catheterization and the patient transferred to our medical  center. Subjective (Events in last 24 hours):   Pt awake, alert. Bipap on. NAD. Feeling better today, breathing better, pap settings improved. D/w patient again about improvement prior to [facility]. Patient gayle. Also mentioned he is on BB and HR is borderline at present. Will continue for now.      Objective:   /68   Pulse 55   Temp 98.7 °F (37.1 °C) (Axillary)   Resp 28   Ht 5' 10\" (1.778 m)   Wt 195 lb (88.5 kg)   SpO2 92%   BMI 27.98 kg/m²      vss  TELEMETRY: sinus nickie 45-50     Physical Exam:  General Appearance: alert and oriented to person, place and time, in no acute distress  Cardiovascular: slow rate, regular rhythm, normal S1 and S2, no murmurs, rubs, clicks, or gallops, distal pulses intact, no carotid bruits, no JVD  Pulmonary/Chest: clear upper, diminished in bases   Abdomen: soft, non-tender, non-distended, normal bowel sounds, no masses   Extremities: no cyanosis, clubbing or edema, pulse   Skin: warm and dry, no rash or erythema  Neurological: alert, oriented, normal speech, no focal findings or movement disorder noted    Medications:    [Held by provider] losartan  25 mg Oral Daily    atorvastatin  40 mg Oral Nightly    enoxaparin  1 mg/kg SubCUTAneous Q12H    levothyroxine  50 mcg Oral Daily    zinc sulfate  50 mg Oral Daily    metoprolol tartrate  12.5 mg Oral Q6H    clopidogrel  75 mg Oral Daily    aspirin  81 mg Oral Daily    ascorbic acid  1,000 mg Oral Daily    sodium chloride flush  5-40 mL IntraVENous 2 times per day    dexamethasone  20 mg IntraVENous Q24H    Followed by   Bob Almanza ON 12/26/2021] dexamethasone  10 mg IntraVENous Q24H    baricitinib  4 mg Oral Daily    Vitamin D  2,000 Units Oral Daily      nitroGLYCERIN 5 mcg/min (12/22/21 0942)    sodium chloride 25 mL (12/23/21 2222)     benzonatate, 200 mg, TID PRN  LORazepam, 1 mg, Q6H PRN  sodium chloride flush, 5-40 mL, PRN  sodium chloride, 25 mL, PRN  ondansetron, 4 mg, Q8H PRN   Or  ondansetron, 4 mg, Q6H PRN  polyethylene glycol, 17 g, Daily PRN  acetaminophen, 650 mg, Q6H PRN   Or  acetaminophen, 650 mg, Q6H PRN  benzocaine-menthol, 1 lozenge, Q2H PRN  albuterol sulfate HFA, 2 puff, Q4H PRN        Diagnostics:  EKG:   Sinus bradycardia  Right bundle branch block  Low voltage QRS, consider pulmonary disease, pericardial effusion, or normal variant  Nonspecific T wave abnormality  Abnormal ECG  When compared with ECG of 22-DEC-2021 05:05,  QT has shortened  Confirmed by Darshana Ness MD, Vanna Benedict ([id number]) on 12/24/2021   Echo:    Conclusions      Summary   Normal left ventricle size and systolic function. Ejection fraction was   estimated at 65 %. There were no regional left ventricular wall motion   abnormalities and wall thickness was within normal limits. Signature      ----------------------------------------------------------------   Electronically signed by Amira Ambrosio MD (Interpreting   physician) on 12/22/2021   Stress:     Left Heart Cath:     Lab Data:    Cardiac Enzymes:  No results for input(s): CKTOTAL, CKMB, CKMBINDEX, TROPONINI in the last 72 hours.     CBC:   Lab Results   Component Value Date    WBC 14.2 12/23/2021    RBC 4.82 12/23/2021    HGB 15.3 12/23/2021    HCT 44.2 12/23/2021     12/23/2021       CMP:    Lab Results   Component Value Date     12/24/2021    K 4.8 12/24/2021     12/24/2021    CO2 19 12/24/2021    BUN 32 12/24/2021    CREATININE 0.7 12/24/2021    LABGLOM >90 12/24/2021    GLUCOSE 131 12/24/2021    CALCIUM 8.2 12/24/2021       Hepatic Function Panel:  No results found for: ALKPHOS, ALT, AST, PROT, BILITOT, BILIDIR, IBILI, LABALBU    Magnesium:    Lab Results   Component Value Date    MG 2.6 12/22/2021       PT/INR:  No results found for: PROTIME, INR    HgBA1c:    Lab Results   Component Value Date    LABA1C 5.8 12/22/2021       FLP:    Lab Results   Component Value Date    TRIG 71 12/22/2021    HDL 39 12/22/2021    LDLCALC 21 12/22/2021       TSH:    Lab Results   Component Value Date    TSH 0.648 12/22/2021         Assessment:  COVID 19  PNA  Acute respir failure- on pap    NSTEMI-plans for cath once improved  HyperK- 4.8 today, down from 5.0   HTN-  HLD-  Sinus bradycardia- mid 40s-55, on lopressor 12.5 mg Q6H. Plan:  Lopressor 12.5 mg Q6H. May need to cut back to BID. Will continue Q6H for now if HR stays mid 40s or above. Losartan being held for low BP. Continue lipitor 40 mg, asa, plavix. Cath will be planned once improved clinically. Will see EOD for now. Call with any questions/concerns.    Electronically signed by Kj Loco PA-C on 12/24/2021 at 8:25 AM

## 2021-12-25 NOTE — PROGRESS NOTES
Patient transitioned this morning from Bipap to heated HiFlo nasal cannula and able to be weaned to 40 liters and 55% FiO2 at latest reading. Patient tolerating eating meals today and working on incentive spirometer. Daughter Ijeoma Montes updated on plan of care and updates for the day.

## 2021-12-25 NOTE — PROGRESS NOTES
Hospitalist Progress Note    Patient:  [facility]      Unit/Bed:3B-32/032-A    YOB: 1970    MRN: [id number]       Acct: [de-identified]     PCP: Gerhardt Auer    Date of Admission: 12/21/2021    Assessment/Plan:    Anticipated Discharge in :     [facility] [address] Problems    Diagnosis Date Noted    NSTEMI (non-ST elevated myocardial infarction) ([facility] [address] [I21.4] 12/21/2021    Acute hypoxemic respiratory failure due to COVID-19 ([facility] [address] [U07.1, J96.01] 12/21/2021       Acute hypoxic respiratory failure secondary to COVID-19 pneumonia, unvaccinated  Requiring high O2 support through BiPAP, weaning  Onset of symptoms 12/7/2021. COVID-19 PCR positive on 12/15/2021. Currently requiring high O2 support with BiPAP 24/10 and 100% FiO2 with O2 sats of 92%. Continue O2 support with weaning per RT and COVID-19 protocol. High intensity Decadron per Covid protocol started the 20 mg IV x5 days then 10 mg IV x5 days. Started on baricitinib on admission  Procalcitonin at 0.2 with low probability of superimposed bacterial pneumonia. Vitamin D, vitamin C, and zinc.  Continue O2 support with weaning per RT and COVID-19 protocol  Encourage IS and acapella as tolerated     Sepsis secondary to above  Treatment as above     Chest pain r/o NSTEMI  No prior known history of coronary artery disease although risk factors including hypertension and hyperlipidemia. Highest troponin was 2.1 at [facility] on 12/21/2021 at 12:20 PM, EKG showed sinus bradycardia, non specific EKG changes  Continue heparin and nitroglycerin gtt. for now  Continue aggressive guideline directed medical therapy with aspirin, Plavix, losartan, metoprolol, and statin. Patient of cardiologist Dr. Bertha Starr from [facility].   Cardiology consultation for possible PCI, unstable to do cath on admission due to desaturations on max BIPAP  Echo unremarkable    Essential Hypertension  Started in Nitro drip on admission  Holding home dose hydrochlorothiazide for now  Continue metoprolol 12.5 q6, reduced to BID  Losartan held     Hyperlipidemia  start statin therapy.     Hypothyroidism  Resume home dose levothyroxine     DVT prophylaxis:  heparin GTT     Chief Complaint:   SOB; NSTEMI    Hospital Course:   Per admission H&P:    Kevin Ferrell is a 46 y.o. male with PMHx of hypertension, hyperlipidemia, renal calculi, irritable bowel syndrome, hypothyroidism, who was transferred as a direct admit to [address] [place] [address] on 12/21/2021 from [facility] after he was hospitalized over there on 12/15/2021 with acute hypoxemic respiratory failure secondary to COVID-19 pneumonia then developed an NSTEMI on 12/21/2021 with acute pressure-like chest pain. Patient was transferred upon the recommendations of cardiology     Apparently patient started having symptoms on 12/7/2021 consisting of fatigue, decreased energy, decreased appetite, weakness, and high fevers of 102.6 °F and presented to [facility] on 12/15/2021 which was the date when his COVID-23 PCR was found to be positive. Patient is unvaccinated against COVID-19. He was found to be hypoxemic down to 84% on room air. CT angiogram of the chest on admission showed multifocal pneumonia without evidence of PE. Initiated on Decadron and remdesivir and at least one dose of azithromycin from the emergency room. Patient seems to have completed remdesivir treatment with first dose on 12/15. No further antibiotics were then given.     Patient unfortunately continued to progressively worsen requiring more oxygen support reaching high settings on BiPAP. On the evening of 12/20/2021 patient had some rhythm changes on telemetry and troponin levels returned back initially mildly elevated at 0.5 (reference less than 0.034), followed by an elevated level of 0.76 although patient remained initially chest pain-free or asymptomatic.   Patient was initiated on IV nitroglycerin drip as well as heparin drip and cardiology were consulted. This morning 12/21 patient reported mild tightness in his chest while on BiPAP and an initial EKG only showed some peaked T waves and no acute ischemic or ST segment changes.       Cardiology Dr. Piper Benentt evaluated patient in consultation today 12/21 and stated that patient will likely need cardiac catheterization once more stable and recommended starting the patient on aggressive guideline directed optimal medical therapy including aspirin and beta-blocker, nitrates, Plavix and statins. (Patient previously on losartan combined with hydrochlorothiazide 100/12.5, levothyroxine levothyroxine 100, Norvasc 10).    Finally this afternoon, patient's third troponin levels returned back elevated at more than 2 and a repeat EKG was obtained showing questionable mild ST segment changes concerning for possible ST elevation in the inferior leads. Patient was then emergently transferred by EMS to our facility as a code STEMI by hospitalist at [facility] ([facility]). However, code STEMI was canceled in route by cardiologist.     Upon patient's discharge from [facility] to our facility blood pressure 112/69 with a respiratory of 24 on BiPAP with 94% O2 sats.       Lab work at [facility] showed a troponin of 2.1 from today 12/21/2021 at 12:20 PM which was up from 0.76, D-dimer at 0.71, WBC count of 12.8, hemoglobin 15.2, hematocrit 43.20 platelet count of 412. Glucose 112, sodium 133, potassium 4.3, chloride 103, bicarb 24, glucose 112, BUN of 19 and a creatinine of 0.7. LFTs within normal limits. Twelve-lead EKG from today 12/21/2021 normal sinus rhythm with right bundle base block but no acute ST segment changes.     At the time of evaluation on the Covid unit patient was laying in bed with the BiPAP vehicle very anxious complaining about the humidity and has a pressure of his room and wanting to decrease the thermostat setting.   He was in a critical state of respiratory failure requiring high or maximum BiPAP settings of 24/10 to keep his O2 sats in the low 90s. His O2 sats easily drops to the 80s with minimal motion or movement on the bed or even while attempting to speak.     Discussed CODE STATUS and his wishes regarding invasive ventilatory support to meet his oxygen demands if BiPAP fails and patient confirms his decision to be a full code and to be given the benefit of ventilatory support if he ever gets to the point where he needs it. Subjective:   Patient seen and examined, but able to wean BIPAP slowly, try HF today as tolerated. BP stable, afebrile. Feels a little bitter, still complains of chest tightness. Cardio plans to cath once more stable.       Medications:  Reviewed    Infusion Medications    nitroGLYCERIN 5 mcg/min (12/22/21 0942)    sodium chloride 25 mL (12/23/21 2222)     Scheduled Medications    metoprolol tartrate  12.5 mg Oral BID    [Held by provider] losartan  25 mg Oral Daily    atorvastatin  40 mg Oral Nightly    enoxaparin  1 mg/kg SubCUTAneous Q12H    levothyroxine  50 mcg Oral Daily    zinc sulfate  50 mg Oral Daily    clopidogrel  75 mg Oral Daily    aspirin  81 mg Oral Daily    ascorbic acid  1,000 mg Oral Daily    sodium chloride flush  5-40 mL IntraVENous 2 times per day    dexamethasone  20 mg IntraVENous Q24H    Followed by   Albina Falcon ON 12/26/2021] dexamethasone  10 mg IntraVENous Q24H    baricitinib  4 mg Oral Daily    Vitamin D  2,000 Units Oral Daily     PRN Meds: benzonatate, LORazepam, sodium chloride flush, sodium chloride, ondansetron **OR** ondansetron, polyethylene glycol, acetaminophen **OR** acetaminophen, benzocaine-menthol, albuterol sulfate HFA      Intake/Output Summary (Last 24 hours) at 12/25/2021 0700  Last data filed at 12/25/2021 0409  Gross per 24 hour   Intake 100 ml   Output 1525 ml   Net -1425 ml       Diet:  Diet NPO    Exam:  /69   Pulse 59   Temp 98.5 °F (36.9 °C) (Axillary)   Resp 17   Ht 5' 10\" (1.778 m)   Wt 195 lb (88.5 kg)   SpO2 92%   BMI 27.98 kg/m²     General appearance: No apparent distress at rest, appears stated age and cooperative. On BIPAP  HEENT: Pupils equal, round, and reactive to light. Conjunctivae/corneas clear. Neck: Supple, with full range of motion. No jugular venous distention. Trachea midline. Respiratory:  Normal respiratory effort. Diminished BS bilaterally without Rales/Wheezes/Rhonchi. Cardiovascular: Regular rate and rhythm with normal S1/S2 without murmurs, rubs or gallops. Abdomen: Soft, non-tender, non-distended with normal bowel sounds. Musculoskeletal: passive and active ROM x 4 extremities. Skin: Skin color, texture, turgor normal.  No rashes or lesions. Neurologic:  Neurovascularly intact without any focal sensory/motor deficits. Cranial nerves: II-XII intact, grossly non-focal.  Psychiatric: Alert and oriented, thought content appropriate, normal insight  Capillary Refill: Brisk,< 3 seconds   Peripheral Pulses: +2 palpable, equal bilaterally       Labs:   Recent Labs     12/23/21  0333 12/25/21  0409   WBC 14.2* 9.5   HGB 15.3 15.5   HCT 44.2 45.3    348     Recent Labs     12/23/21  0333 12/24/21  0401 12/25/21  0409    133* 134*   K 5.0 4.8 4.9    101 101   CO2 19* 19* 20*   BUN 29* 32* 33*   CREATININE 0.7 0.7 0.7   CALCIUM 8.2* 8.2* 8.2*     No results for input(s): AST, ALT, BILIDIR, BILITOT, ALKPHOS in the last 72 hours. No results for input(s): INR in the last 72 hours. No results for input(s): Yolanda Divine in the last 72 hours. Urinalysis:    No results found for: Gianluca Estuardo, BACTERIA, RBCUA, BLOODU, SPECGRAV, Maureen [place] [address]    Radiology:  XR CHEST PORTABLE   Final Result   1. Normal heart size. No effusion. 2. Moderate groundglass infiltrates scattered throughout both lungs, consistent with interstitial pneumonia/edema. **This report has been created using voice recognition software.   It may contain minor errors

## 2021-12-26 NOTE — PROGRESS NOTES
Patient arrived from ER in wheelchair. Patient able to ambulate from chair to bed. Vitals taken. Telemetry attached. Patient assessed. Patient resting comfortably in bed at this time. Denies need.

## 2021-12-26 NOTE — PROGRESS NOTES
Hospitalist Progress Note    Patient:  Jaqueline Trores      Unit/Bed:3B-32/032-A    YOB: 1970    MRN: [id number]       Acct: [de-identified]     PCP: Marcel Logan    Date of Admission: 12/21/2021    Assessment/Plan:    1. Acute hypoxemic respiratory failure: Secondary to COVID-19 infection. Patient is currently on heated high flow 60/65% FiO2. BiPAP at night. Patient with limited mobility. Incentive spirometry reviewed at bedside. Continue dexamethasone. Patient on baricitinib from admission. Needs aggressive pulmonary toiletry and aggressive weaning process. Encourage patient to prone as tolerated. 2. COVID-19 infection: As above. 3. NSTEMI: Appreciate cardiology input. Patient is asymptomatic at this time, will need a cardiac catheterization once he is improved medically. Okay to wean nitro drip off. Patient is on aspirin, Plavix. Continue with metoprolol and statin. Losartan not initiated due to lower blood pressures. Continue with Lovenox 1 mg/kg every 12  4. Hypertension: DC nitro drip and titrate metoprolol. 5. DVT prophylaxis: Lovenox      Subjective (past 24 hours):   Patient seen and examined at bedside. Patient reports he feels very weak, he states he is not able to tolerate BiPAP well but is tolerating the high flow. Limited mobility. No complaints of chest pain. No acute overnight events.       Medications:  Reviewed    Infusion Medications    nitroGLYCERIN 5 mcg/min (12/22/21 0942)    sodium chloride 25 mL (12/23/21 2222)     Scheduled Medications    metoprolol tartrate  12.5 mg Oral BID    [Held by provider] losartan  25 mg Oral Daily    atorvastatin  40 mg Oral Nightly    enoxaparin  1 mg/kg SubCUTAneous Q12H    levothyroxine  50 mcg Oral Daily    zinc sulfate  50 mg Oral Daily    clopidogrel  75 mg Oral Daily    aspirin  81 mg Oral Daily    ascorbic acid  1,000 mg Oral Daily    sodium chloride flush  5-40 mL IntraVENous 2 times per day    dexamethasone  10 mg IntraVENous Q24H    baricitinib  4 mg Oral Daily    Vitamin D  2,000 Units Oral Daily     PRN Meds: benzonatate, LORazepam, sodium chloride flush, sodium chloride, ondansetron **OR** ondansetron, polyethylene glycol, acetaminophen **OR** acetaminophen, benzocaine-menthol, albuterol sulfate HFA      Intake/Output Summary (Last 24 hours) at 12/26/2021 1028  Last data filed at 12/25/2021 2039  Gross per 24 hour   Intake 580 ml   Output 650 ml   Net -70 ml       Diet:  ADULT DIET; Regular; Low Sodium (2 gm)    Exam:  /72   Pulse 62   Temp 98.4 °F (36.9 °C) (Axillary)   Resp 18   Ht 5' 10\" (1.778 m)   Wt 195 lb (88.5 kg)   SpO2 92%   BMI 27.98 kg/m²     General appearance: No apparent distress, appears stated age and cooperative. Respiratory:  Normal respiratory effort. Diminished breath sounds but clear   Cardiovascular: Regular rate and rhythm with normal S1/S2 without murmurs, rubs or gallops. Abdomen: Soft, non-tender, non-distended with normal bowel sounds.   Extremities: no pedal edema  Skin:  no rashes    Labs:   Recent Labs     12/25/21  0409   WBC 9.5   HGB 15.5   HCT 45.3        Recent Labs     12/24/21  0401 12/25/21  0409 12/26/21  0459   * 134* 133*   K 4.8 4.9 5.0    101 99   CO2 19* 20* 21*   BUN 32* 33* 26*   CREATININE 0.7 0.7 0.6   CALCIUM 8.2* 8.2* 8.1*       Urinalysis:    No results found for: Dorla Benes, BACTERIA, RBCUA, Evelena Jurist, GLUCOSEU    Radiology:  ECHO Complete 2D W Doppler W Color    Result Date: 12/22/2021  Transthoracic Echocardiography Report (TTE)  Demographics   Patient Name    Edwar Mclain Gender                Male   MR #            [id number]    Race                                                  Ethnicity   Account #       [de-identified]    Room Number           9036   Accession       [id number]   Date of Study         12/22/2021  Number   Date of Birth   1970   Referring Physician   Herbert Rogers MD Moose Landeros MD   Age             46 year(s)   Iza Bergman [facility]                                Interpreting          Rut Shea MD                               Physician  Procedure Type of Study   TTE procedure:ECHOCARDIOGRAM COMPLETE 2D W DOPPLER W COLOR. Procedure Date Date: 12/22/2021 Start: 09:09 AM Study Location: Bedside Technical Quality: Adequate visualization Indications:NSTEMI. Additional Medical History:NSTEMI, Tachycardia, Hypertension, Hyperlipidemia, Hypothyroidism. Patient Status: Routine Height: 70 inches Weight: 195 pounds BSA: 2.06 m^2 BMI: 27.98 kg/m^2 BP: 107/67 mmHg  Conclusions   Summary  Normal left ventricle size and systolic function. Ejection fraction was  estimated at 65 %. There were no regional left ventricular wall motion  abnormalities and wall thickness was within normal limits. Signature   ----------------------------------------------------------------  Electronically signed by Rut Shea MD (Interpreting  physician) on 12/22/2021 at 09:19 PM  ----------------------------------------------------------------   Findings   Mitral Valve  The mitral valve structure was normal with normal leaflet separation. DOPPLER: The transmitral velocity was within the normal range with no  evidence for mitral stenosis. Trace mitral regurgitation is present. Aortic Valve  The aortic valve was trileaflet with normal thickness and cuspal  separation. DOPPLER: Transaortic velocity was within the normal range with  no evidence of aortic stenosis. There was no evidence of aortic  regurgitation. Tricuspid Valve  The tricuspid valve structure was normal with normal leaflet separation. DOPPLER: There was no evidence of tricuspid stenosis. Mild tricuspid  regurgitation visualized. Pulmonic Valve  The pulmonic valve leaflets exhibited normal thickness, no calcification,  and normal cuspal separation.  DOPPLER: The transpulmonic velocity was  within the normal range with no evidence for regurgitation. Left Atrium  Left atrial size was normal.   Left Ventricle  Normal left ventricle size and systolic function. Ejection fraction was  estimated at 65 %. There were no regional left ventricular wall motion  abnormalities and wall thickness was within normal limits. Right Atrium  Right atrial size was normal.   Right Ventricle  The right ventricular size was normal with normal systolic function and  wall thickness. Pericardial Effusion  The pericardium was normal in appearance with no evidence of a pericardial  effusion. Pleural Effusion  No evidence of pleural effusion. Aorta / Great Vessels  -Aortic root dimension within normal limits.  -The Pulmonary artery is within normal limits. -IVC size is within normal limits with normal respiratory phasic changes.   M-Mode/2D Measurements & Calculations   LV Diastolic    LV Systolic Dimension: 3.2  AV Cusp Separation: 2.2 cmAO  Dimension: 5 cm cm                          Root Dimension: 3.2 cmLA Area:  LV FS:36 %      LV Volume Diastolic: 204 ml 83.2 cm^2  LV PW           LV Volume Systolic: 41 ml  Diastolic: 1.1  LV EDV/LV EDV Index: 118  cm              ml/57 m^2LV ESV/LV ESV  Septum          Index: 41 ml/20 m^2         RV Diastolic Dimension: 3 cm  Diastolic: 1 cm EF Calculated: 65.3 %                                              Ascending Aorta: 3.1 cm                                              LA volume/Index: 35.4 ml                                              /17m^2  Doppler Measurements & Calculations   MV Peak E-Wave: 63.8 cm/s  AV Peak Velocity: 147 LVOT Peak Velocity: 113  MV Peak A-Wave: 50.1 cm/s  cm/s                  cm/s  MV E/A Ratio: 1.27         AV Peak Gradient:     LVOT Peak Gradient: 5  MV Peak Gradient: 1.63     8.64 mmHg             mmHg  mmHg                                                   TV Peak E-Wave: 65.1 cm/s  MV Deceleration Time: 338 TV Peak A-Wave: 60.8 cm/s  msec                             IVRT: 74 msec         TV Peak Gradient: 1.7                                                   mmHg  MV E' Septal Velocity: 7.5                       TR Velocity:217 cm/s  cm/s                       AV DVI (Vmax):0.77    TR Gradient:18.84 mmHg  MV A' Septal Velocity:                           PV Peak Velocity: 84.4  11.5 cm/s                                        cm/s  MV E' Lateral Velocity:                          PV Peak Gradient: 2.85  10.7 cm/s                                        mmHg  MV A' Lateral Velocity:  13.5 cm/s  E/E' septal: 8.51                                DC ED Velocity: 96.4 cm/s  E/E' lateral: 5.96  [email or web address] [id number]    XR CHEST PORTABLE    Result Date: 12/22/2021  PROCEDURE: XR CHEST PORTABLE CLINICAL INFORMATION: CHF. covid PNA COMPARISON: No prior study. TECHNIQUE: A single mobile view of the chest was obtained. 1. Normal heart size. No effusion. 2. Moderate groundglass infiltrates scattered throughout both lungs, consistent with interstitial pneumonia/edema. **This report has been created using voice recognition software. It may contain minor errors which are inherent in voice recognition technology. ** Final report electronically signed by Dr. Liam Lam on 12/22/2021 10:49 AM      DVT prophylaxis: [x] Lovenox                                 [] SCDs                                 [] SQ Heparin                                 [] Encourage ambulation           [] Already on Anticoagulation     Code Status: Full Code    Tele:   [x] yes             [] no    Active Hospital Problems    Diagnosis Date Noted    NSTEMI (non-ST elevated myocardial infarction) ([facility] [address] [I21.4] 12/21/2021    Acute hypoxemic respiratory failure due to COVID-19 ([facility] [address] [U07.1, J96.01] 12/21/2021       Electronically signed by Darlene Brizuela MD on 12/26/2021 at 10:28 AM

## 2021-12-26 NOTE — PROGRESS NOTES
Cardiology Progress Note      Patient:  Bozena Mason  YOB: 1970  MRN: [id number]   Acct: [de-identified]  Admit Date:  12/21/2021  Primary Cardiologist: Dr Tanmay Marcelino   Seen by Dr. Andre Sinclair      Per prior cardiology consult note-  REASON FOR CONSULTATION:  Acute coronary syndrome in 49-year-old  gentleman, admitted with COVID-19 pneumonia and hypoxic respiratory  failure on BiPAP, 100% oxygen.     HISTORY OF PRESENT ILLNESS:  This is actually a 49-year-old   gentleman with past medical history of hypertension, hyperlipidemia,  renal stone, irritable bowel syndrome, hypothyroidism, transferred to  [address] [place] [address] on 12/21/2021 from [facility] because of chest pain and troponin elevation and presumed to be  non-ST-elevation myocardial infarction.  The patient has been  hospitalized in [facility] starting from 12/15/2021 for hypoxic respiratory  failure secondary to COVID-19 pneumonia and so yesterday on 12/21/2021,  the patient started to have chest pain, retrosternal pressure, heaviness  like, 5/10 radiating, no associated sweating or diaphoresis, but  persisted for almost over an hour until he started a nitroglycerin drip.   With the nitroglycerin drip, the chest pain resolved and he has been  chest pain free and this morning has been off nitro and has still been  chest pain free.  The patient is still in hypoxic respiratory failure,  on BiPAP with 100% oxygen requirement to saturate 91-92%.     The patient has a history of signs and symptoms of viral infection,  apparently generalized body weakness and fever since 12/07 and got  admitted on 12/15 at [facility] after COVID-19 PCR was found to be  positive and he was hypoxic at that time saturating 84% at room air.  CT  of the chest showed on admission multifocal pneumonia without evidence  of pulmonary thromboembolism, was then started on Decadron, remdesivir,  azithromycin.  The patient completed the course of remdesivir, the first  dose was 12/15.  Progressively, he is short of breath, got worse and the  patient required BiPAP and so troponin initially was mildly elevated to  0.5 and subsequently went up to 0.76 at [facility] and the patient has been  chest pain free initially and until yesterday when he started to have  chest pain and required some nitroglycerin drip and EKG showed  nonspecific ST-T segment abnormality.  Cardiologist at [facility],  Dr. Dulce Maria Ventura has been consulted and evaluated the patient and recommended  cardiac catheterization and the patient transferred to our medical  center. Subjective (Events in last 24 hours):   Pt awake, alert. Bipap on. Still very tired. Has been weaning bipap some. Was put on HF NC yesterday. Back on bipap now. Nurse Farida Mckinney did stress importance of incentive spirometer while I was present for lung improvement. D/w patient still awaiting improvement prior to [facility]. Patient expressed understanding. Objective:   /72   Pulse 62   Temp 98.4 °F (36.9 °C) (Axillary)   Resp 18   Ht 5' 10\" (1.778 m)   Wt 195 lb (88.5 kg)   SpO2 90%   BMI 27.98 kg/m²      vss  TELEMETRY: nsr, high 50s-mid 60s.       Physical Exam:  General Appearance: alert and oriented to person, place and time, in no acute distress  Cardiovascular: normal rate, regular rhythm, normal S1 and S2, no murmurs, rubs, clicks, or gallops, distal pulses intact, no carotid bruits, no JVD  Pulmonary/Chest: clear upper, diminished in bases   Abdomen: soft, non-tender, non-distended, normal bowel sounds, no masses   Extremities: no cyanosis, clubbing or edema, pulse   Skin: warm and dry, no rash or erythema  Neurological: alert, oriented, normal speech, no focal findings or movement disorder noted    Medications:    metoprolol tartrate  12.5 mg Oral BID    [Held by provider] losartan  25 mg Oral Daily    atorvastatin  40 mg Oral Nightly    enoxaparin  1 mg/kg SubCUTAneous Q12H    levothyroxine  50 mcg Oral Daily    zinc sulfate  50 mg Oral Daily    clopidogrel  75 mg Oral Daily    aspirin  81 mg Oral Daily    ascorbic acid  1,000 mg Oral Daily    sodium chloride flush  5-40 mL IntraVENous 2 times per day    dexamethasone  10 mg IntraVENous Q24H    baricitinib  4 mg Oral Daily    Vitamin D  2,000 Units Oral Daily      nitroGLYCERIN 5 mcg/min (12/22/21 0942)    sodium chloride 25 mL (12/23/21 2222)     benzonatate, 200 mg, TID PRN  LORazepam, 1 mg, Q6H PRN  sodium chloride flush, 5-40 mL, PRN  sodium chloride, 25 mL, PRN  ondansetron, 4 mg, Q8H PRN   Or  ondansetron, 4 mg, Q6H PRN  polyethylene glycol, 17 g, Daily PRN  acetaminophen, 650 mg, Q6H PRN   Or  acetaminophen, 650 mg, Q6H PRN  benzocaine-menthol, 1 lozenge, Q2H PRN  albuterol sulfate HFA, 2 puff, Q4H PRN        Diagnostics:  EKG:   Sinus bradycardia  Right bundle branch block  Low voltage QRS, consider pulmonary disease, pericardial effusion, or normal variant  Nonspecific T wave abnormality  Abnormal ECG  When compared with ECG of 22-DEC-2021 05:05,  QT has shortened  Confirmed by Ted Cockayne MD, [employer] ([id number]) on 12/24/2021   Echo:    Conclusions      Summary   Normal left ventricle size and systolic function. Ejection fraction was   estimated at 65 %. There were no regional left ventricular wall motion   abnormalities and wall thickness was within normal limits. Signature      ----------------------------------------------------------------   Electronically signed by Rut Shea MD (Interpreting   physician) on 12/22/2021   Stress:     Left Heart Cath:     Lab Data:    Cardiac Enzymes:  No results for input(s): CKTOTAL, CKMB, CKMBINDEX, TROPONINI in the last 72 hours.     CBC:   Lab Results   Component Value Date    WBC 9.5 12/25/2021    RBC 4.91 12/25/2021    HGB 15.5 12/25/2021    HCT 45.3 12/25/2021     12/25/2021       CMP:    Lab Results   Component Value Date     12/26/2021    K 5.0 12/26/2021    CL 99 12/26/2021    CO2 21 12/26/2021    BUN 26 12/26/2021    CREATININE 0.6 12/26/2021    LABGLOM >90 12/26/2021    GLUCOSE 134 12/26/2021    CALCIUM 8.1 12/26/2021       Hepatic Function Panel:  No results found for: ALKPHOS, ALT, AST, PROT, BILITOT, BILIDIR, IBILI, LABALBU    Magnesium:    Lab Results   Component Value Date    MG 2.6 12/22/2021       PT/INR:  No results found for: PROTIME, INR    HgBA1c:    Lab Results   Component Value Date    LABA1C 5.8 12/22/2021       FLP:    Lab Results   Component Value Date    TRIG 71 12/22/2021    HDL 39 12/22/2021    LDLCALC 21 12/22/2021       TSH:    Lab Results   Component Value Date    TSH 0.648 12/22/2021         Assessment:  COVID 19  PNA  Acute respir failure- on pap    NSTEMI-plans for cath once improved, trop was downtrending after  initial positive  HyperK- K 5.0, monitor closely. HTN-well controlled   HLD-, on l ipitor 40. Sinus bradycardia- improved with cutting BB to BID dosing. Plan:  Lopressor 12.5 mg BID. HR better, in more normal range with BID vs Q6H. Losartan being held for low BP. BP normal today. Continue lipitor 40 mg, asa, plavix. Cath will be planned once improved clinically. Continue to try to wean to HFNC. Patient understands the goals and will work to improve. Will see EOD for now. Call with any questions/concerns.    Electronically signed by Reena Barraza PA-C on 12/26/2021 at 8:11 AM

## 2021-12-26 NOTE — PROGRESS NOTES
Patient's family came to nursing station and stated that patient requested a softer diet since he had some difficulty with chewing/swallowing lunch. Writer has changed to a dental soft diet.

## 2021-12-27 NOTE — PROGRESS NOTES
Hospitalist Progress Note    Patient:  Suly Parnell      Unit/Bed:3B-32/032-A    YOB: 1970    MRN: [id number]       Acct: [de-identified]     PCP: Aure Haro    Date of Admission: 12/21/2021    Assessment/Plan:    1. Acute hypoxemic respiratory failure: Secondary to COVID-19 infection. Patient is currently on heated high flow with increased FiO2 needs today up to 90%. Discussed with patient again the importance of proning and pulmonary toiletry. Patient reported he cannot prone completely so we laid him on his right side. Continue dexamethasone. Patient on baricitinib from admission. Needs aggressive pulmonary toiletry and aggressive weaning process. 2. COVID-19 infection: As above. 3. NSTEMI: Appreciate cardiology input. Patient is asymptomatic at this time, will need a cardiac catheterization once he is improved medically. Okay to wean nitro drip off. Patient is on aspirin, Plavix. Continue with metoprolol and statin. Losartan not initiated due to lower blood pressures. Continue with Lovenox 1 mg/kg every 12  4. Hypertension: DC nitro drip and titrate metoprolol. 5. DVT prophylaxis: Lovenox      Subjective (past 24 hours):   Patient seen and examined at bedside. Requiring more FiO2 today and is high flow. Patient still feels very weak and short of breath.     Medications:  Reviewed    Infusion Medications    nitroGLYCERIN Stopped (12/26/21 1507)    sodium chloride 25 mL (12/23/21 2222)     Scheduled Medications    metoprolol tartrate  12.5 mg Oral BID    [Held by provider] losartan  25 mg Oral Daily    atorvastatin  40 mg Oral Nightly    enoxaparin  1 mg/kg SubCUTAneous Q12H    levothyroxine  50 mcg Oral Daily    zinc sulfate  50 mg Oral Daily    clopidogrel  75 mg Oral Daily    aspirin  81 mg Oral Daily    ascorbic acid  1,000 mg Oral Daily    sodium chloride flush  5-40 mL IntraVENous 2 times per day    dexamethasone  10 mg IntraVENous Q24H    baricitinib 4 mg Oral Daily    Vitamin D  2,000 Units Oral Daily     PRN Meds: benzonatate, LORazepam, sodium chloride flush, sodium chloride, ondansetron **OR** ondansetron, polyethylene glycol, acetaminophen **OR** acetaminophen, benzocaine-menthol, albuterol sulfate HFA      Intake/Output Summary (Last 24 hours) at 12/27/2021 1202  Last data filed at 12/27/2021 0946  Gross per 24 hour   Intake 300 ml   Output 1000 ml   Net -700 ml       Diet:  ADULT DIET; Easy to Chew; Low Sodium (2 gm)    Exam:  /61   Pulse 76   Temp 98.4 °F (36.9 °C) (Oral)   Resp 18   Ht 5' 10\" (1.778 m)   Wt 195 lb (88.5 kg)   SpO2 91%   BMI 27.98 kg/m²     General appearance: No apparent distress, appears stated age and cooperative. Respiratory:  Normal respiratory effort. Diminished breath sounds but clear   Cardiovascular: Regular rate and rhythm with normal S1/S2 without murmurs, rubs or gallops. Abdomen: Soft, non-tender, non-distended with normal bowel sounds.   Extremities: no pedal edema  Skin:  no rashes    Labs:   Recent Labs     12/25/21  0409 12/27/21  0323   WBC 9.5 10.6   HGB 15.5 15.3   HCT 45.3 44.7    296     Recent Labs     12/25/21  0409 12/26/21  0459   * 133*   K 4.9 5.0    99   CO2 20* 21*   BUN 33* 26*   CREATININE 0.7 0.6   CALCIUM 8.2* 8.1*       Urinalysis:    No results found for: Andres Mejiaey, BACTERIA, RBCUA, Lilly Juaquin, GLUCOSEU    Radiology:  ECHO Complete 2D W Doppler W Color    Result Date: 12/22/2021  Transthoracic Echocardiography Report (TTE)  Demographics   Patient Name    Kristina Arias Gender                Male   MR #            [id number]    Race                                                  Ethnicity   Account #       [de-identified]    Room Number           6581   Accession       [id number]   Date of Study         12/22/2021  Number   Date of Birth   1970   Referring Physician   Garland Rodarte MD                                                     [facility] Beatriz Acosta MD   Age             46 year(s)   Lori Lynn [facility]                                Interpreting          Tonda Spurling MD                               Physician  Procedure Type of Study   TTE procedure:ECHOCARDIOGRAM COMPLETE 2D W DOPPLER W COLOR. Procedure Date Date: 12/22/2021 Start: 09:09 AM Study Location: Bedside Technical Quality: Adequate visualization Indications:NSTEMI. Additional Medical History:NSTEMI, Tachycardia, Hypertension, Hyperlipidemia, Hypothyroidism. Patient Status: Routine Height: 70 inches Weight: 195 pounds BSA: 2.06 m^2 BMI: 27.98 kg/m^2 BP: 107/67 mmHg  Conclusions   Summary  Normal left ventricle size and systolic function. Ejection fraction was  estimated at 65 %. There were no regional left ventricular wall motion  abnormalities and wall thickness was within normal limits. Signature   ----------------------------------------------------------------  Electronically signed by Tonda Spurling MD (Interpreting  physician) on 12/22/2021 at 09:19 PM  ----------------------------------------------------------------   Findings   Mitral Valve  The mitral valve structure was normal with normal leaflet separation. DOPPLER: The transmitral velocity was within the normal range with no  evidence for mitral stenosis. Trace mitral regurgitation is present. Aortic Valve  The aortic valve was trileaflet with normal thickness and cuspal  separation. DOPPLER: Transaortic velocity was within the normal range with  no evidence of aortic stenosis. There was no evidence of aortic  regurgitation. Tricuspid Valve  The tricuspid valve structure was normal with normal leaflet separation. DOPPLER: There was no evidence of tricuspid stenosis. Mild tricuspid  regurgitation visualized. Pulmonic Valve  The pulmonic valve leaflets exhibited normal thickness, no calcification,  and normal cuspal separation.  DOPPLER: The transpulmonic velocity was  within the normal range with no evidence for regurgitation. Left Atrium  Left atrial size was normal.   Left Ventricle  Normal left ventricle size and systolic function. Ejection fraction was  estimated at 65 %. There were no regional left ventricular wall motion  abnormalities and wall thickness was within normal limits. Right Atrium  Right atrial size was normal.   Right Ventricle  The right ventricular size was normal with normal systolic function and  wall thickness. Pericardial Effusion  The pericardium was normal in appearance with no evidence of a pericardial  effusion. Pleural Effusion  No evidence of pleural effusion. Aorta / Great Vessels  -Aortic root dimension within normal limits.  -The Pulmonary artery is within normal limits. -IVC size is within normal limits with normal respiratory phasic changes.   M-Mode/2D Measurements & Calculations   LV Diastolic    LV Systolic Dimension: 3.2  AV Cusp Separation: 2.2 cmAO  Dimension: 5 cm cm                          Root Dimension: 3.2 cmLA Area:  LV FS:36 %      LV Volume Diastolic: 457 ml 04.1 cm^2  LV PW           LV Volume Systolic: 41 ml  Diastolic: 1.1  LV EDV/LV EDV Index: 118  cm              ml/57 m^2LV ESV/LV ESV  Septum          Index: 41 ml/20 m^2         RV Diastolic Dimension: 3 cm  Diastolic: 1 cm EF Calculated: 65.3 %                                              Ascending Aorta: 3.1 cm                                              LA volume/Index: 35.4 ml                                              /17m^2  Doppler Measurements & Calculations   MV Peak E-Wave: 63.8 cm/s  AV Peak Velocity: 147 LVOT Peak Velocity: 113  MV Peak A-Wave: 50.1 cm/s  cm/s                  cm/s  MV E/A Ratio: 1.27         AV Peak Gradient:     LVOT Peak Gradient: 5  MV Peak Gradient: 1.63     8.64 mmHg             mmHg  mmHg                                                   TV Peak E-Wave: 65.1 cm/s  MV Deceleration Time: 338                        TV Peak A-Wave: 60.8 cm/s  msec IVRT: 74 msec         TV Peak Gradient: 1.7                                                   mmHg  MV E' Septal Velocity: 7.5                       TR Velocity:217 cm/s  cm/s                       AV DVI (Vmax):0.77    TR Gradient:18.84 mmHg  MV A' Septal Velocity:                           PV Peak Velocity: 84.4  11.5 cm/s                                        cm/s  MV E' Lateral Velocity:                          PV Peak Gradient: 2.85  10.7 cm/s                                        mmHg  MV A' Lateral Velocity:  13.5 cm/s  E/E' septal: 8.51                                IL ED Velocity: 96.4 cm/s  E/E' lateral: 5.96  [email or web address] [id number]    XR CHEST PORTABLE    Result Date: 12/22/2021  PROCEDURE: XR CHEST PORTABLE CLINICAL INFORMATION: CHF. covid PNA COMPARISON: No prior study. TECHNIQUE: A single mobile view of the chest was obtained. 1. Normal heart size. No effusion. 2. Moderate groundglass infiltrates scattered throughout both lungs, consistent with interstitial pneumonia/edema. **This report has been created using voice recognition software. It may contain minor errors which are inherent in voice recognition technology. ** Final report electronically signed by Dr. Leo Schwab on 12/22/2021 10:49 AM      DVT prophylaxis: [x] Lovenox                                 [] SCDs                                 [] SQ Heparin                                 [] Encourage ambulation           [] Already on Anticoagulation     Code Status: Full Code    Tele:   [x] yes             [] no    Active Hospital Problems    Diagnosis Date Noted    NSTEMI (non-ST elevated myocardial infarction) ([facility] [address] [I21.4] 12/21/2021    Acute hypoxemic respiratory failure due to COVID-19 [facility]) [U07.1, J96.01] 12/21/2021       Electronically signed by Elier Terrell MD on 12/27/2021 at 12:02 PM

## 2021-12-27 NOTE — CARE COORDINATION
Discharge Planning Update: Following for Covid and NSTEMI. Remains on HHF at 60L and 95% FIO2 with sat at 91%. No fever. Ntg discontinued. Hendrix cath is out. Cardiac Cath needed when pt stable. Plans return home alone. Continue to follow for needs.

## 2021-12-28 NOTE — SIGNIFICANT EVENT
Updated daughter Mandi Vargas via telephone. All questions were answered. I did explain to her that the patient needs a central line. She did consent. I did explain the current process and treatment for Covid. She did ask about ivermectin. I did explain that this is not used in severe disease and we do not administer it at our hospital.  She stated understanding. I did explain visiting policy. All questions were answered. Updated primary nurse    Electronically signed by Raghavendra Dyer.  Leola Carlisle CNP on 12/28/2021 at 7:33 AM

## 2021-12-28 NOTE — SIGNIFICANT EVENT
Updated Ghada Alcala via telephone. All questions were answered. She voiced her concern about how sick her father is. I explained that he is on max support and requiring 100% 02. She understands the situation and appreciates the call and support. Updated primary nurse. Electronically signed by Claryce Hodgkins.  Antonia Carlisle CNP on 12/28/2021 at 2:53 PM

## 2021-12-28 NOTE — PROCEDURES
ICU PROCEDURE - ENDOTRACHEAL INTUBATION    Isabelle Torres     MRN#: [id number]  21      [email or web address] [email or web address]     : 1970      INDICATION: Acute respiratory failure, hypoxia failed BiPAP    TIME OUT: taken    Permission obtained, risks/benefits reviewed:    ANESTHESIA:   []Ketamine  []Ativan  [] Morphine  [x]Propofol  [x]Other medications: Etomidate/Versed    ESTIMATED BLOOD LOSS:  None. COMPLICATIONS:  [x]N/A  [] Other:    LARYNGOSCOPIC AIRWAY GRADE (CORMACK-LEHANE):[]1  []2a  [x]2b []3  []4        INTUBATION EQUIPMENT USED:  [x] Direct video laryngoscope only    OUTCOME: Successful placement of #7.5 Taperguard Evac endotracheal via   [x]Oral route    INSERTION DEPTH: 24    cm from   [x]lip           CONFIRMATION OF TUBE POSITION:   [x]Capnography - Strong & repeatable exhaled CO2 detection   [x]Multiple point auscultation   [x]SpO2 response   [x]STAT X-ray   [x]Bronchoscopic assessment    UNUSUAL FINDINGS:    PROCEDURE:     Using direct video laryngoscopy, the vocal cords were visualized and the endotracheal tube was placed through the cords under direct vision. Good breath sounds were auscultated bilaterally without sounds over abdomen. Appropriate strong & repeatable exhaled CO2 detection was confirmed.        Electronically signed by DEBORA Rosas CNP on 2021 at 4:53 AM

## 2021-12-28 NOTE — PROCEDURES
ICU PROCEDURE - CVC PLACEMENT:    Risks and benefits to the procedure were discussed. Alternatives and their risks were discussed as well. INDICATION: Acute hypoxic respiratory failure    SITE: Right Subclavian Vein      CONSENT: was obtained after explaining indication/risks to patient and/or next of kin    TIME OUT: taken    STERILE PREP: Prior to the procedure all involved washed their hands. Full maximum sterile field/barrier technique was followed (with cap and mask, gloves and sterile gown, as well as broad field sterile drapes). Local disinfection was performed with broad field application of orange dyed chloraprep solution, which was allowed to dry fully prior to the initiation of the procedure. LOCAL ANESTHETIC: Aqueous lidocaine 1%     ESTIMATED BLOOD LOSS: Minimal    ULTRASOUND GUIDANCE USED: No    PROCEDURE:  Using modified seldinger technique, the appropriate vessel was located with the introducer needle subsequent to the use of a 22g x 1.5 inch \"\" needle. The flexible J-tip wire was passed without difficulty or resistance, followed by minimal skin incisions over the introducer needle. The introducer needle was withdrawn and discarded, maintaining manual control of the guidewire at all times. After dilation of the tract, a preflushed 3 lumen CVC catheter was advanced over the guidewire without difficulty or resistance to its full extent. The guidewire was withdrawn and discarded and good return of nonpulsatile, dark venous blood assured through all lumes, with easy flushing of the lumens. The line was sutured in place then the site was reprepped with a  chlorprep solution followed by a Biopatch device. After hemostasis was assured, an op site was applied and all sharps and materials were discarded appropriately. EBL < 5 ml. COMPLICATIONS: None    POST PROCEDURE CHEST XRAY HAS BEEN ORDERED    Electronically signed by     Magnus Carlisle CNP on 12/28/2021 at 9:27 AM

## 2021-12-28 NOTE — PLAN OF CARE
Chart reviewed. PCR positive for MSSA. Initiate nafcillin. Case was discussed with Dr. Carlos Riggins. Case was discussed with via telephone. Pronation therapy was initiated. Vent changes were made by Dr. Carlos Riggins at the bedside. Primary nurse was updated. Electronically signed by Althea Holt.  Howie Carlisle CNP on 12/28/2021 at 1:27 PM

## 2021-12-28 NOTE — PROGRESS NOTES
bipap    Wounds:  Deep Tissue Injury (buttocks)       Current Nutrition Therapies:    Diet NPO  ADULT TUBE FEEDING; Orogastric; Peptide Based; Continuous; 10; Yes; 10; Q 12 hours; 45; 30; Q 4 hours  Current Tube Feeding (TF) Orders:  · Feeding Route: Orogastric  · Formula: Peptide Based (Vital 1.2)  · Schedule: Continuous (begin at 10ml/ hour, goal 45ml/ hour)  · Additives/Modulars: Protein (once daily)  · Water Flushes: as per Doctor  · Goal TF & Flush Orders Provides:  2028 kcals (1296 TF, 104 modular, 628 diprivan), 107 grams protein (81 TF, 26 modular), 119 grams CHO, 5 grams fiber, 876ml free water in 1155ml total volume (1080 TF, 75 modular)/ 24 hours     Additional Calorie Sources:   Diprivan at 23.8ml/ hour provides 628 lipid kcals/ 24 hours    Anthropometric Measures:  · Height: 5' 10\" (177.8 cm)  · Current Body Weight: 175 lb 0.7 oz (79.4 kg) (12/28; no edema)   · Admission Body Weight: 195 lb (88.5 kg) (12/21; no edema)    · Usual Body Weight:  (no weight records per EMR)     · Ideal Body Weight: 166 lbs;   · BMI: 25.1  · BMI Categories: Overweight (BMI 25.0-29. 9)       Nutrition Diagnosis:   · Inadequate oral intake related to impaired respiratory function as evidenced by NPO or clear liquid status due to medical condition,intubation,nutrition support - enteral nutrition      Nutrition Interventions:   Food and/or Nutrient Delivery:  Start Tube Feeding  Nutrition Education/Counseling:  No recommendation at this time   Coordination of Nutrition Care:  Continue to monitor while inpatient    Goals:  EN to provide % nutrient needs appropriate for COVID recovery process while intubated       Nutrition Monitoring and Evaluation:   Behavioral-Environmental Outcomes:  None Identified   Food/Nutrient Intake Outcomes:  Enteral Nutrition Intake/Tolerance  Physical Signs/Symptoms Outcomes:  Biochemical Data,Fluid Status or Edema,Hemodynamic Status,Nutrition Focused Physical Findings,Skin,Weight,GI Status Discharge Planning:     Too soon to determine     Electronically signed by Alireza Oleary RD, LD on 12/28/21 at 2:08 PM EST    Contact: [phone]

## 2021-12-28 NOTE — CARE COORDINATION
12/28/21, 2:06 PM EST    DISCHARGE ON GOING EVALUATION    HERNAN DOE CTR day: 7  Location: -06/006-A Reason for admit: NSTEMI (non-ST elevated myocardial infarction) ([facility] [address] [I21.4]  Acute hypoxemic respiratory failure due to COVID-19 [facility]) [U07.1, J96.01]   Procedure:   12/22 Echo with EF 65%  12/28 Intubated  12/28 CVC right subclavian  12/28 CXR:   The endotracheal tube terminates approximately 5 cm above the level of the alyssa. The nasogastric catheter in the right subclavian central venous catheter are stable. No pneumothorax is observed. Diffuse patchy multifocal airspace opacities    are unchanged     Barriers to Discharge: Failed bipap. Transferred to [address] [place] [address] ICU and intubated early this morning. CVC placed. Parlalytic drip started. Proning started. Pronation therapy continues - 12/12 schedule. Remains paralyzed and sedated on vent w/ETT on PCV, peep 16, FIO2 100%, sats 87%. Tmax 100. NSR. Respiratory culture +MSSA by PCR. Telemetry, CVC, OG to payam SINGER. Nimbex @ 2 mcg/kg/min, fentanyl @ 100 mcg/hr, versed @ 6 mg/hr, diprivan @ 50 mcg/kg/min, asa, lipitor, baricitinib, plavix, IV decadron, lovenox bid, IV pepcid, synthroid, lopressor, IV nafcillin. K+ 5.4, CO2 19, CRP 1.59, , alb 3.2, ast 73, ferritin 2420. PCP: Martin Burgess  Readmission Risk Score: 7.1 ( )%  Patient Goals/Plan/Treatment Preferences: From home alone. Plan pending clinical course.

## 2021-12-28 NOTE — CONSULTS
CRITICAL CARE CONSULT NOTE      Patient:  Mariah Stanford    Unit/Bed:3A-06/006-A  YOB: 1970  MRN: [id number]   PCP: Krys Rosenberg  Date of Admission: 12/21/2021  Chief Complaint:-Shortness of breath    Assessment and Plan:    1. Acute respiratory failure, hypoxia: 12/28/2021 patient had failed BiPAP therapy. Patient was on max FiO2 and pressure support. He was tachypneic and hypoxic.  12/28/2021 patient was orally intubated and connected to PCV mode of ventilation. Continue with lung protection strategies targeting peak pressure 35 or less and plateau 30 or less. Respiratory culture is pending. 2. Severe Covid-19: Onset of symptoms were 12/7/2021. Covid-19+ testing 12/15/2021. We will continue baricitinib and Decadron therapy. 3. Severe ARDS: 12/28/2021 we will initiate pronation therapy. 4. Hyponatremia, chronic: Mild. Urine sodium and osmolarity are pending. 12/22/2021 TSH 0.648, triglycerides 71.   5. Hypothyroidism: History, Synthroid has been continued      INITIAL H AND P AND ICU COURSE:  Mariah Stanford is a 23-year-old  male transferred to 3A COVID-ICU on 12/28/2021 in need of emergent intubation. Patient has past medical history significant for essential hypertension dyslipidemia nephrolithiasis IBS hypothyroidism    Jl Romero was transferred to [facility] on 12/21/2021 from [facility] secondary to complaints of chest pain and elevated troponin presumed to be a non-STEMI. Patient had been hospitalized in [facility] starting from 12/15/2021 for hypoxic respiratory failure secondary to Covid-19. On 12/20/2021 patient started to have retrosternal chest pain \" heaviness\" 5 out of 10 nonradiating with no associated diaphoresis nausea or vomiting. Drip was started. Pain did resolve. Patient did require BiPAP with noted improvement in SaO2 to 91 to 92%. Patient noted complaints of generalized body weakness and fever since 12/7/2021.   Covid-19 was diagnosed on 81 mg Oral Daily    ascorbic acid  1,000 mg Oral Daily    sodium chloride flush  5-40 mL IntraVENous 2 times per day    dexamethasone  10 mg IntraVENous Q24H    baricitinib  4 mg Oral Daily    Vitamin D  2,000 Units Oral Daily     Continuous Infusions:   midazolam      nitroGLYCERIN Stopped (12/26/21 1507)    sodium chloride 25 mL (12/23/21 2222)       PHYSICAL EXAMINATION:  T: 98.1.  P: 66. RR: 32. B/P: 115/72. FiO2: 100. O2 Sat: 86.  I/O: Pending  Body mass index is 25.12 kg/m². GCS:   15  General:   Pale dusky acutely ill  HEENT:  normocephalic and atraumatic. No scleral icterus. PERR  Neck: supple. No Thyromegaly. Lungs: clear to auscultation, diminished in bases bilaterally. No retractions  Cardiac: RRR. No JVD. Abdomen: soft. Nontender. Extremities:  No clubbing, cyanosis, or edema x 4. Vasculature: capillary refill < 3 seconds. Palpable dorsalis pedis pulses. Skin:  warm and dry. Psych:  Alert and oriented x3. Affect appropriate  Lymph:  No supraclavicular adenopathy. Neurologic:  No focal deficit. No seizures. Data: (All radiographs, tracings, PFTs, and imaging are personally viewed and interpreted unless otherwise noted).  12/28/2021 340 sodium 131 potassium 3.9 chlorides 90 CO2 22   BUN 23 creatinine 0.6   Glucose 120   CRP 0.88   12/10/2021 Covid-19-positive   12/22/2021 echo-LVEF 65% no regional left ventricular wall motion abnormality   Cardiac telemetry normal sinus rhythm        Seen with multidisciplinary ICU team yes. Meets Continued ICU Level Care Criteria:    [x] Yes   [] No - Transfer Planned to listed location:  [] HOSPITALIST CONTACTED-      Case and plan discussed with Dr. Mik Wallace.     CC time 35 min       Electronically signed by DEBORA Whitaker - [facility]  CRITICAL CARE SPECIALIST

## 2021-12-28 NOTE — PROGRESS NOTES
Date: 12/28/2021  Time: 0445  Patient identity confirmed:  Yes  Indications: RESPIRATORY DISTRESS  Preoxygenation:yes    Laryngoscope size and type Glidescope  Airway introducer used: No  Evac: No  ETT size:a 7.5 cuffed # [id number]  Number of attempts:1   Cords visualized:  [x] Clearly  [] Poorly  Breath sounds present bilaterally: Yes   ETCO2   [x] Positive   ETT secured at  23    ETT secured with humberto  Chest x-ray ordered: Yes     Difficult airway:    No       If yes, was red tape placed around ETT:   No    Was this a Code Situation:    No                  Procedure performed by: [address], RCP  6:27 AM

## 2021-12-29 NOTE — CARE COORDINATION
12/29/21, 2:57 PM EST    DISCHARGE ON GOING EVALUATION    HERNAN DOE CTR day: 8  Location: 3A-06/006-A Reason for admit: NSTEMI (non-ST elevated myocardial infarction) ([facility] [address] [I21.4]  Acute hypoxemic respiratory failure due to COVID-19 ([facility] [address] [U07.1, J96.01]   Procedure:   12/22 Echo with EF 65%  12/28 Intubated  12/28 CVC right subclavian  12/29 CXR:   1. Possible early pneumomediastinum. Possible gas superimposed over the left scapula. 2. Persistent moderate severity patchy bilateral pulmonary opacities     Barriers to Discharge: Remains paralyzed and sedated on vent w/ETT on PCV, peep 18, FIO2 100%, sats 96%. Tmax 101.1. NSR. Respiratory culture +MSSA by PCR. Telemetry, CVC, OG w/TF @ 10 ml/hr, hare. Nimbex @ 3 mcg/kg/min, fentanyl @ 100 mcg/hr, versed @ 6 mg/hr, levo @ 2 mg/hr, diprivan @ 50 mcg/kg/min, asa, lipitor, baricitinib, plavix, IV decadron, lovenox bid, IV pepcid, SSI, lactobacillus, synthroid, lopressor, IV nafcillin. Na+ 132, K+ 5.4, LA 2.3, wbc 20.7. PCP: [facility]  Readmission Risk Score: 8.3 ( )%  Patient Goals/Plan/Treatment Preferences: From home alone. Plan pending clinical course.

## 2021-12-29 NOTE — PROGRESS NOTES
CRITICAL CARE PROGRESS NOTE      Patient:  Eulalia Case    Unit/Bed:3A-06/006-A  YOB: 1970  MRN: [id number]   PCP: Laurent Becker  Date of Admission: 12/21/2021  Chief Complaint:-COVID-19    Assessment and Plan:      1. Acute hypoxic respiratory failure: Requiring intubation after failing high flow nasal cannula and BiPAP on 12/28. Maintain peak pressures less than 35.  2. COVID-19: Positive on 12/15/2021. [facility] Continue baricitinib and Decadron therapy. 3. Severe ARDS: PF ratio 63. Continue pronation therapy. 4. Bacterial pneumonia: Positive for MSSA. Nafcillin day #2.  5. NSTEMI: Cardiology recommendations to wait for cardiac cath until clinically more stable. Patient was started on aspirin Plavix and full dose Lovenox. Status post nitro drip. Echocardiogram within normal limits. 12.5 mg of Lopressor if tolerates. 6. Mild hyponatremia: mild, monitor. Serum osmolality pending  7. Mild hyperkalemia: Mild, monitor  8. Impaired glucose tolerance: Added low-dose sliding scale insulin. Likely secondary to steroid use. A1c pending. 9. Leukocytosis: Patient has MSSA. Continue antibiotic therapy. Patient did have fever overnight. Continue to monitor. INITIAL H AND P AND ICU COURSE:    Eulalia Case is a 76-year-old white male who presented to [facility] on 12/21/2021 as a transfer from [facility] with complaints of chest pain and elevated troponin with concern for NSTEMI. He has a past medical history of hypertension, dyslipidemia, nephrolithiasis, irritable bowel bowel syndrome, hypothyroidism. Per report patient had been hospitalized in [facility] starting 12/15/2021 hypoxic respiratory failure secondary to COVID-19. On 12/20/2021 he started having chest pain and heaviness nitro drip was started. Pain did resolve. Patient did require BiPAP.   [facility] did test positive for Covid on 12/15/2021. CT was positive for pneumonia but negative for PE. He did receive Decadron and remdesivir at outlying facility. After incidence of chest pain recommendation from [facility] cardiology was to transfer patient to [facility] for cardiac catheterization. He was evaluated by cardiology on 12/23 at that time cardiology recommended to hold on cardiac catheterization secondary to respiratory failure. Nitro was weaned off. On 12/28 he was tachypneic and hypoxic on max BiPAP settings. He was transferred to  for emergent intubation. Past Medical History: per HPI  Family History: No known family history  Social History: Non-smoker, denies alcohol or drug use    ROS   Sedated on mechanical ventilation    Scheduled Meds:   enoxaparin  1 mg/kg SubCUTAneous Q12H    midazolam  4 mg IntraVENous Once    chlorhexidine  15 mL Mouth/Throat BID    famotidine (PEPCID) injection  20 mg IntraVENous BID    sodium chloride flush  5-40 mL IntraVENous 2 times per day    aspirin  81 mg Per NG tube Daily    atorvastatin  40 mg Per NG tube Nightly    baricitinib  4 mg Per NG tube Daily    clopidogrel  75 mg Per NG tube Daily    levothyroxine  50 mcg Per NG tube Daily    nafcillin  1,000 mg IntraVENous Q4H    polyethylene glycol  17 g Per NG tube Daily    lactobacillus  1 capsule Per NG tube Daily with breakfast    senna  5 mL Per NG tube Nightly    metoprolol tartrate  12.5 mg Oral BID    [Held by provider] losartan  25 mg Oral Daily    dexamethasone  10 mg IntraVENous Q24H     Continuous Infusions:   midazolam 6 mg/hr (12/28/21 2137)    sodium chloride      propofol 50 mcg/kg/min (12/29/21 1757)    fentaNYL 500 mcg in dextrose 5% 100 ml infusion 100 mcg/hr (12/29/21 0328)    cisatracurium (NIMBEX) infusion 3 mcg/kg/min (12/29/21 0126)    norepinephrine 2 mcg/min (12/29/21 0039)       PHYSICAL EXAMINATION:  T:  98.8. P:  76. RR:  20. B/P:  92/68. FiO2:  100.  O2 Sat:  95.  I/O: 1419/1000  Body mass index is 25.12 kg/m². PC: 20/18: TV: 486: RRTotal: 20: Ti:1 sec  General:   Acute on chronically ill-appearing white male  HEENT:  normocephalic and atraumatic. No scleral icterus. PERR  Neck: supple. No Thyromegaly. Lungs: clear to auscultation. No retractions  Cardiac: RRR. No JVD. Abdomen: soft. Nontender. Extremities:  No clubbing, cyanosis, or edema x 4. Vasculature: capillary refill < 3 seconds. Palpable dorsalis pedis pulses. Skin:  warm and dry. Psych: Sedated and paralyzed on mechanical ventilation  Lymph:  No supraclavicular adenopathy. Neurologic:  No focal deficit. No seizures. Data: (All radiographs, tracings, PFTs, and imaging are personally viewed and interpreted unless otherwise noted).  Sodium 132, potassium 5.4, chloride 98, CO2 25, BUN 22, creatinine 0.7, anion gap 9.0, glucose 181.  WBC 20.7, hemoglobin 15.7, hematocrit 45.9, platelet count 519   Telemetry shows normal sinus rhythm   Chest x-ray 12/28/2021 reports diffuse patchy multifocal airspace opacities.  Echocardiogram 12/22/2021 reports ejection fraction 65%, normal LV function.  EKG 12/28/2021 reports normal sinus rhythm with right bundle branch block.  Chest x-ray 12/29/2021 reports possible early pneumomediastinum. Persistent moderate patchy bilateral opacities. Meets Continued ICU Level Care Criteria:    [x] Yes   [] No - Transfer Planned to listed location:  [] HOSPITALIST CONTACTED-      Case and plan discussed with Dr. Lizeth Umana. Electronically signed by Alyssa Street.  DEBORA Piper - CNP  CRITICAL CARE SPECIALIST

## 2021-12-30 NOTE — ACP (ADVANCE CARE PLANNING)
Advance Care Planning     Advance Care Planning Inpatient Note  Spiritual Care Department    Today's Date: 12/30/2021  Unit: [address] CCU 3A    Received request from rounding. Upon review of chart and communication with care team, Spiritual Care will defer advance care planning with patient at this time. . Child/Children was/were present in the room during visit. Goals of ACP Conversation:  Discuss advance care planning documents    Health Care Decision Makers:     No healthcare decision makers have been documented. Click here to complete [place] including selection of the Healthcare Decision Maker Relationship (ie \"Primary\")  Summary:  No Decision Maker named by patient at this time    Advance Care Planning Documents (Patient Wishes):  None     Assessment:   spoke with Walter Mckenzie, the pt's eldest child. Walter Mckenzie stated she has two siblings, but she is the eldest.  Walter Mckenzie stated that she has also been sharing pt's condition with the pt's mother. Pt is  and there is no existing AD. Walter Mckenzie shared that her father did not have any underlying health conditions aside from high blood pressure and obesity. Walter Mckenzie shared that the pt has been sick with Covid for over 3 weeks, starting out at [employer], and the news she received about his condition this morning was concerning. Walter Mckenzie was also stressed because today she has been trying to apply for a medical leave of absence from her father's job and trying to figure out how to get his bills paid. Interventions:  Confirmed with pt's daughter that no AD forms have been completed    Care Preferences Communicated:   No    Outcomes/Plan:  Spoke with pt's eldest daughter.     Electronically signed by Chaplain Jj on 12/30/2021 at 12:50 PM

## 2021-12-30 NOTE — PROGRESS NOTES
CRITICAL CARE PROGRESS NOTE      Patient:  Barbara Gómez    Unit/Bed:3A-06/006-A  YOB: 1970  MRN: [id number]   PCP: Amber Garduno  Date of Admission: 12/21/2021  Chief Complaint:- COVID-19    Assessment and Plan:       1. Acute hypoxic respiratory failure: Requiring intubation after failing high flow nasal cannula and BiPAP on 12/28. Maintain peak pressures less than 35.  2. COVID-19: Positive on 12/15/2021. Status post remdesivir at outlying facility. Continue baricitinib and Decadron therapy. 3. Severe ARDS: PF ratio 63. Continue pronation therapy. 4. Bacterial pneumonia: Positive for MSSA. Nafcillin day #3  5. NSTEMI: Cardiology recommendations to wait for cardiac cath until clinically more stable. Patient was started on aspirin Plavix and full dose Lovenox. Status post nitro drip. Echocardiogram within normal limits. 12.5 mg of Lopressor if tolerates. 6. Hypotension: Likely secondary to sedation. No other signs of infection besides pneumonia which is being treated. Monitor. 7. Left pneumothorax: Chest x-ray reveals less than 5% left-sided apical pneumothorax. Monitor. Repeat chest x-ray pending for 12 PM to reevaluate. I did explain to nurse to call with any signs of respiratory or hemodynamic compromise. 8. Mild hyponatremia: mild, monitor. Serum osmolality pending  9. Mild hyperkalemia: Mild, monitor  10. Impaired glucose tolerance: Added low-dose sliding scale insulin. Likely secondary to steroid use. A1c 5.8  11. Leukocytosis: Patient has MSSA. Continue antibiotic therapy. Continue to monitor.     INITIAL H AND P AND ICU COURSE:     Barbara Gómez is a 57-year-old white male who presented to [facility] on 12/21/2021 as a transfer from [facility] with complaints of chest pain and elevated troponin with concern for NSTEMI.   He has a past medical history of hypertension, dyslipidemia, nephrolithiasis, irritable bowel bowel syndrome, hypothyroidism. Per report patient had been hospitalized in [facility] starting 12/15/2021 hypoxic respiratory failure secondary to COVID-19. On 12/20/2021 he started having chest pain and heaviness nitro drip was started. Pain did resolve. Patient did require BiPAP. At the outlying facility he did test positive for Covid on 12/15/2021. CT was positive for pneumonia but negative for PE. He did receive Decadron and remdesivir at outlying facility. After incidence of chest pain recommendation from [address] cardiology was to transfer patient to [facility] for cardiac catheterization. He was evaluated by cardiology on 12/23 at that time cardiology recommended to hold on cardiac catheterization secondary to respiratory failure. Nitro was weaned off. On 12/28 he was tachypneic and hypoxic on max BiPAP settings.   He was transferred to  for emergent intubation.     Past Medical History: per HPI  Family History: No known family history  Social History: Non-smoker, denies alcohol or drug use     ROS   Sedated on mechanical ventilation    Scheduled Meds:   insulin lispro  0-6 Units SubCUTAneous Q6H    enoxaparin  1 mg/kg SubCUTAneous Q12H    midazolam  4 mg IntraVENous Once    chlorhexidine  15 mL Mouth/Throat BID    famotidine (PEPCID) injection  20 mg IntraVENous BID    sodium chloride flush  5-40 mL IntraVENous 2 times per day    aspirin  81 mg Per NG tube Daily    atorvastatin  40 mg Per NG tube Nightly    baricitinib  4 mg Per NG tube Daily    clopidogrel  75 mg Per NG tube Daily    levothyroxine  50 mcg Per NG tube Daily    nafcillin  1,000 mg IntraVENous Q4H    polyethylene glycol  17 g Per NG tube Daily    lactobacillus  1 capsule Per NG tube Daily with breakfast    senna  5 mL Per NG tube Nightly    metoprolol tartrate  12.5 mg Oral BID    [Held by provider] losartan  25 mg Oral Daily    dexamethasone  10 mg IntraVENous Q24H     Continuous Infusions:   dextrose  midazolam 6 mg/hr (12/30/21 0600)    sodium chloride      propofol 50 mcg/kg/min (12/30/21 0600)    fentaNYL 500 mcg in dextrose 5% 100 ml infusion 100 mcg/hr (12/30/21 0559)    cisatracurium (NIMBEX) infusion 3 mcg/kg/min (12/30/21 0600)    norepinephrine Stopped (12/30/21 0010)       PHYSICAL EXAMINATION:  T:  99.3.  P:  87. RR:  20. B/P:  102/71. FiO2:  100. O2 Sat:  90.  I/O:  2183/1825  Body mass index is 27.27 kg/m². PC: 20/18: TV: 515: RRTotal: 20: Ti:1 sec  General:   Acute on chronically ill-appearing white male  HEENT:  normocephalic and atraumatic. No scleral icterus. PERR  Neck: supple. No Thyromegaly. Lungs: clear to auscultation. No retractions  Cardiac: RRR. No JVD. Abdomen: soft. Nontender. Extremities:  No clubbing, cyanosis, or edema x 4. Vasculature: capillary refill < 3 seconds. Palpable dorsalis pedis pulses. Skin:  warm and dry. Psych: Sedated and paralyzed on mechanical ventilation  Lymph:  No supraclavicular adenopathy. Neurologic:  No focal deficit. No seizures. Data: (All radiographs, tracings, PFTs, and imaging are personally viewed and interpreted unless otherwise noted).  Sodium 133, potassium 5.3, chloride 98, CO2 26, BUN 18, creatinine 0.5, anion gap 9.0, glucose 123.  WBC 15.5, hemoglobin 14.7, hematocrit 42.2, platelet count 040   Telemetry shows normal sinus rhythm   Chest x-ray 12/30/2021 reports small left pneumothorax less than 5%. Increased tissue emphysema. Meets Continued ICU Level Care Criteria:    [x] Yes   [] No - Transfer Planned to listed location:  [] HOSPITALIST CONTACTED-      Case and plan discussed with Dr. Salvador Mcgraw. Electronically signed by DEBORA Khan - CNP  CRITICAL CARE SPECIALIST

## 2021-12-30 NOTE — CARE COORDINATION
12/30/21, 12:16 PM EST    DISCHARGE ON GOING EVALUATION    HERNAN DOE CTR day: 9  Location: 3A-06/006-A Reason for admit: NSTEMI (non-ST elevated myocardial infarction) ([facility] [address] [I21.4]  Acute hypoxemic respiratory failure due to COVID-19 ([facility] [address] [U07.1, J96.01]   Procedure:   12/22 Echo with EF 65%  12/28 Intubated  12/28 CVC right subclavian  12/30 CXR:   Small left apical pneumothorax. Increased left chest wall and neck soft tissue emphysema. Similar findings consistent with viral/atypical pneumonia     Barriers to Discharge: Small PTX on XR today; plan to repeat CXR this afternoon to evaluate if chest tube is needed. Remains paralyzed and sedated on vent w/ETT on PCV, peep 18, FIO2 100%, sats 89%. Tmax 100.2. 's. Respiratory culture +MSSA by PCR. Telemetry, CVC, OG w/TF @ 20 ml/hr, hare. Nimbex @ 3 mcg/kg/min, fentanyl @ 100 mcg/hr, versed @ 6 mg/hr, diprivan @ 50 mcg/kg/min, asa, lipitor, baricitinib, plavix, IV decadron, lovenox bid, IV pepcid, SSI, lactobacillus, synthroid, lopressor, IV nafcillin. Na+ 133, K+ 5.3, triglycerides 245, wbc 15.5. PCP: Maria Esther Valencia  Readmission Risk Score: 8 ( )%  Patient Goals/Plan/Treatment Preferences: From home alone.  Plan pending clinical course.

## 2021-12-30 NOTE — SIGNIFICANT EVENT
Updated daughter Mckayla Parham via telephone. All questions were answered. She did express concern about patient not being on vitamin C vitamin D and zinc.  I did explain that we are happy to prescribe them if she chooses so I explained that they can at times clogged the OG tube and have been not shown to provide any benefit. She stated she would talk to her siblings and let us know. All her questions were answered. Electronically signed by Leo Schwab.  Todd Carlisle CNP on 12/30/2021 at 3:10 PM

## 2021-12-30 NOTE — PROGRESS NOTES
Patient cell phone given to daughter Peg Lara. This was discussed with Eileen Kang earlier. Patient other belongings left in room.

## 2021-12-31 NOTE — PROGRESS NOTES
Updated Kenzie Person via telephone. POC, meds/IV, vent/resp/suction, labs/CXR, DX & body systems and vitals covered/reviewed in depth. All questions & concerns addressed to ability.  Electronically signed by Pastor Mt RN on 12/31/2021 at 6:48 PM

## 2021-12-31 NOTE — PROGRESS NOTES
CRITICAL CARE PROGRESS NOTE      Patient:  Adela Pean    Unit/Bed:3A-06/006-A  YOB: 1970  MRN: [id number]   PCP: Patt Boswell  Date of Admission: 12/21/2021  Chief Complaint:- COVID-19    Assessment and Plan:      1. Acute hypoxic respiratory failure: Requiring intubation after failing high flow nasal cannula and BiPAP on 12/28.  Maintain peak pressures less than 35.  2. COVID-19: Positive on 12/15/2021.  Status post remdesivir at outlying facility.  Continue baricitinib and Decadron therapy. 3. Severe ARDS: PF ratio 63.  Continue pronation therapy. 4. Bacterial pneumonia: Positive for MSSA.  Nafcillin day #4  5. NSTEMI: Cardiology recommendations to wait for cardiac cath until clinically more stable.  Patient was started on aspirin Plavix and full dose Lovenox.  Status post nitro drip.  Echocardiogram within normal limits.  12.5 mg of Lopressor if tolerates. 6. Hypotension: Likely secondary to sedation. No other signs of infection besides pneumonia which is being treated. Monitor. 7. Left pneumothorax: Chest x-ray reveals less than 5% left-sided apical pneumothorax. Monitor. Repeat chest x-ray reports tiny pneumothorax. I did explain to nurse to call with any signs of respiratory or hemodynamic compromise. 8. Mild hyponatremia: mild, monitor.  Serum osmolality 291. Cortisol level pending,  patient appears euvolemic. 9. Mild hyperkalemia: Mild, monitor   10. Impaired glucose tolerance: Added low-dose sliding scale insulin.  Likely secondary to steroid use.  A1c 5.8  11.  Leukocytosis: Patient has MSSA.  Continue antibiotic therapy.   Continue to monitor.     INITIAL H AND P AND ICU COURSE:     Abbe Ingram is a 57-year-old white male who presented to [facility] on 12/21/2021 as a transfer from [facility] with complaints of chest pain and elevated troponin with concern for NSTEMI. [facility] has a past medical history of hypertension, dyslipidemia, dextrose      midazolam 6 mg/hr (12/30/21 2251)    sodium chloride      propofol 50 mcg/kg/min (12/31/21 0105)    fentaNYL 500 mcg in dextrose 5% 100 ml infusion 100 mcg/hr (12/31/21 0419)    cisatracurium (NIMBEX) infusion 3 mcg/kg/min (12/30/21 2200)    norepinephrine 2 mcg/min (12/30/21 2200)       PHYSICAL EXAMINATION:  T:  99.7.  P:  89. RR:  21. B/P:  103/70. FiO2:  100. O2 Sat:  94.  I/O:  1436/800  Body mass index is 27.27 kg/m². PC: 20/18: TV: 541: RRTotal: 20: Ti:1 sec: ETCO2: 32.  General:   Acute on chronically ill appearing white male   HEENT:  normocephalic and atraumatic. No scleral icterus. PERR. Orbital edema   Neck: supple. No Thyromegaly. Lungs: clear to auscultation. No retractions  Cardiac: RRR. No JVD. Abdomen: soft. Nontender. Extremities:  No clubbing, cyanosis, or edema x 4. Vasculature: capillary refill < 3 seconds. Palpable dorsalis pedis pulses. Skin:  warm and dry. Psych:  Sedated on mechanical ventilation   Lymph:  No supraclavicular adenopathy. Neurologic:  No focal deficit. No seizures. Data: (All radiographs, tracings, PFTs, and imaging are personally viewed and interpreted unless otherwise noted).  Sodium 130, potassium 4.9, chloride 95, C02 25, BUN 21, creatinine 0.6, anion gap 10.0, glucose 157.  WBC 17.7, H/H 13.8/40.5, platelets 727   Telemetry shows NSR   Chest xray 12/31/2021 interstitial densities and airspace disease, tiny left apical pneumothorax unchanged. Meets Continued ICU Level Care Criteria:    [x] Yes   [] No - Transfer Planned to listed location:  [] HOSPITALIST CONTACTED-      Case and plan discussed with Dr. Benoit Mccoy. Electronically signed by Shelley Gilliam.  DEBORA Cortes - CNP  CRITICAL CARE SPECIALIST

## 2021-12-31 NOTE — PROGRESS NOTES
Pt family at bedside, stated concerned about vitamin regime. And requested they be ordered. POC, meds/IV, vent/resp/suction, labs/CXR, TF/nutrition, DX & body systems, skin/edema and equipment covered/reviewed in depth. All questions & concerns addressed to ability. Marli Cheng NP at bedside to explain in further detail.  Electronically signed by Simeon Roman RN on 12/31/2021 at 12:21 PM

## 2021-12-31 NOTE — PROGRESS NOTES
Comprehensive Nutrition Assessment    Type and Reason for Visit:  Reassess    Nutrition Recommendations/Plan:   Recommend continue to increase TF (Vital 1.2 at 30 ml/hr) to goal rate of 45 ml/hr. Bolus 2.5 oz. Proteinex daily. Additional free water flush per MD.  Continue bowel medications. Nutrition Assessment:    Pt. Improving from a nutritional standpoint AEB tolerating TF at 30 ml/hr; goal rate 45 ml/hr. At risk for further nutrition compromise r/t +COVID 12/15/21 (onset signs/ symptoms 12/7/21), acute respiratory failure, intubated 12/28/21, pronation therapy, severe ARDS, hyponatremia and underlying medical condition (hx HTN). Nutrition recommendations/interventions as per above. Malnutrition Assessment:  Malnutrition Status:  Insufficient data    Context:  Acute Illness     Findings of the 6 clinical characteristics of malnutrition:  Energy Intake:  Unable to assess  Weight Loss:  Unable to assess (no weight records per EMR)     Body Fat Loss:  Unable to assess     Muscle Mass Loss:  Unable to assess    Fluid Accumulation:  Unable to assess     Strength:  Not Performed    Estimated Daily Nutrient Needs:  Energy (kcal):  8359-8402 kcals (25-35) LATE PHASE; Weight Used for Energy Requirements:   (79.4kgm on 12/28)     Protein (g):   grams (1.2-2); Weight Used for Protein Requirements:   (79.4kgm on 12/28)        Fluid (ml/day):  as per Doctor; Method Used for Fluid Requirements:  Other (Comment)      Nutrition Related Findings:   Remains intubated; proning pt; continues on Diprivan; BM 12/28; 12/31: Glucose 157, BUN 21, Cr 0.6, Potassium 4.9, Sodium 130; Rx Insulin, Senokot, Culturelle, Levophed, Nimbex, Fentanyl, Versed; per RN - pt.  Tolerating TF at 30 ml/hr (75, 150 ml residuals); RN aware of goal      Wounds:  Deep Tissue Injury (buttocks)       Current Nutrition Therapies:    Diet NPO  ADULT TUBE FEEDING; Orogastric; Peptide Based; Continuous; 10; Yes; 10; Q 12 hours; 45; 30; Q 4 hours; Protein; 2.5 oz. Proteinex daily  Current Tube Feeding (TF) Orders:  · Feeding Route: Orogastric  · Formula: Peptide Based (Vital 1.2)  · Schedule: Continuous (30 ml/hr; goal 45 ml/hr)  · Additives/Modulars: Protein (once daily)  · Water Flushes: as per Doctor  · Goal TF & Flush Orders Provides: 2028 kcals (6809 TF, 104 modular, 628 diprivan), 107 grams protein (81 TF, 26 modular), 119 grams CHO, 5 grams fiber, 876ml free water in 1155ml total volume (1080 TF, 75 modular)/ 24 hours    Additional Calorie Sources:   Diprivan at 23.8ml/ hour provides 628 lipid kcals/ 24 hours    Anthropometric Measures:  · Height: 5' 10\" (177.8 cm)  · Current Body Weight: 190 lb 0.6 oz (86.2 kg) (12/29 no edema)   · Admission Body Weight: 195 lb (88.5 kg) (12/21; no edema)    · Usual Body Weight:  (no weight records per EMR)     · Ideal Body Weight: 166 lbs;      · BMI: 27.3  · BMI Categories: Overweight (BMI 25.0-29. 9)       Nutrition Diagnosis:   · Inadequate oral intake related to impaired respiratory function as evidenced by NPO or clear liquid status due to medical condition,intubation,nutrition support - enteral nutrition      Nutrition Interventions:   Food and/or Nutrient Delivery:  Continue Current Tube Feeding  Nutrition Education/Counseling:  No recommendation at this time   Coordination of Nutrition Care:  Continue to monitor while inpatient    Goals:  EN to provide % nutrient needs appropriate for COVID recovery process while intubated       Nutrition Monitoring and Evaluation:   Behavioral-Environmental Outcomes:  None Identified   Food/Nutrient Intake Outcomes:  Enteral Nutrition Intake/Tolerance  Physical Signs/Symptoms Outcomes:  Biochemical Data,Fluid Status or Edema,Hemodynamic Status,Nutrition Focused Physical Findings,Skin,Weight,GI Status     Discharge Planning:     Too soon to determine     Electronically signed by Jabari Escobar RD, LD on 12/31/21 at 12:15 PM EST    Contact: [phone]

## 2022-01-01 ENCOUNTER — APPOINTMENT (OUTPATIENT)
Dept: ULTRASOUND IMAGING | Age: 52
DRG: 870 | End: 2022-01-01
Attending: INTERNAL MEDICINE
Payer: COMMERCIAL

## 2022-01-01 ENCOUNTER — APPOINTMENT (OUTPATIENT)
Dept: GENERAL RADIOLOGY | Age: 52
DRG: 870 | End: 2022-01-01
Attending: INTERNAL MEDICINE
Payer: COMMERCIAL

## 2022-01-01 VITALS
OXYGEN SATURATION: 26 % | HEART RATE: 35 BPM | HEIGHT: 70 IN | DIASTOLIC BLOOD PRESSURE: 45 MMHG | SYSTOLIC BLOOD PRESSURE: 84 MMHG | TEMPERATURE: 99.7 F | BODY MASS INDEX: 27.21 KG/M2 | WEIGHT: 190.04 LBS | RESPIRATION RATE: 20 BRPM

## 2022-01-01 LAB
ALLEN TEST: POSITIVE
ANION GAP SERPL CALCULATED.3IONS-SCNC: 15 MEQ/L (ref 8–16)
ANION GAP SERPL CALCULATED.3IONS-SCNC: 7 MEQ/L (ref 8–16)
ANION GAP SERPL CALCULATED.3IONS-SCNC: 8 MEQ/L (ref 8–16)
BACTERIA: ABNORMAL
BASE EXCESS (CALCULATED): -5.5 MMOL/L (ref -2.5–2.5)
BASE EXCESS (CALCULATED): -7.3 MMOL/L (ref -2.5–2.5)
BASE EXCESS (CALCULATED): -7.5 MMOL/L (ref -2.5–2.5)
BASE EXCESS (CALCULATED): 0.2 MMOL/L (ref -2.5–2.5)
BASE EXCESS (CALCULATED): 2.1 MMOL/L (ref -2.5–2.5)
BASOPHILS # BLD: 0.2 %
BASOPHILS # BLD: 0.2 %
BASOPHILS # BLD: 0.3 %
BASOPHILS ABSOLUTE: 0 THOU/MM3 (ref 0–0.1)
BASOPHILS ABSOLUTE: 0 THOU/MM3 (ref 0–0.1)
BASOPHILS ABSOLUTE: 0.1 THOU/MM3 (ref 0–0.1)
BILIRUBIN URINE: NEGATIVE
BLOOD CULTURE, ROUTINE: NORMAL
BLOOD CULTURE, ROUTINE: NORMAL
BLOOD, URINE: ABNORMAL
BUN BLDV-MCNC: 25 MG/DL (ref 7–22)
BUN BLDV-MCNC: 45 MG/DL (ref 7–22)
BUN BLDV-MCNC: 60 MG/DL (ref 7–22)
C-REACTIVE PROTEIN: 18.42 MG/DL (ref 0–1)
CALCIUM IONIZED SERUM: 1.09 MMOL/L (ref 1.12–1.32)
CALCIUM SERPL-MCNC: 7.6 MG/DL (ref 8.5–10.5)
CALCIUM SERPL-MCNC: 7.8 MG/DL (ref 8.5–10.5)
CALCIUM SERPL-MCNC: 7.8 MG/DL (ref 8.5–10.5)
CASTS: ABNORMAL /LPF
CASTS: ABNORMAL /LPF
CHARACTER, URINE: ABNORMAL
CHLORIDE BLD-SCNC: 88 MEQ/L (ref 98–111)
CHLORIDE BLD-SCNC: 93 MEQ/L (ref 98–111)
CHLORIDE BLD-SCNC: 93 MEQ/L (ref 98–111)
CHLORIDE, URINE: < 20 MEQ/L
CHLORIDE, WHOLE BLOOD: 97 MEQ/L (ref 98–109)
CO2: 22 MEQ/L (ref 23–33)
CO2: 26 MEQ/L (ref 23–33)
CO2: 27 MEQ/L (ref 23–33)
COLLECTED BY:: ABNORMAL
COLOR: YELLOW
CREAT SERPL-MCNC: 0.8 MG/DL (ref 0.4–1.2)
CREAT SERPL-MCNC: 1.8 MG/DL (ref 0.4–1.2)
CREAT SERPL-MCNC: 3.5 MG/DL (ref 0.4–1.2)
CREATININE URINE: 115.2 MG/DL
CREATININE URINE: 126.4 MG/DL
CRYSTALS: ABNORMAL
DEVICE: ABNORMAL
DIFFERENTIAL TYPE: ABNORMAL
EKG ATRIAL RATE: 102 BPM
EKG ATRIAL RATE: 103 BPM
EKG ATRIAL RATE: 109 BPM
EKG P AXIS: 68 DEGREES
EKG P AXIS: 70 DEGREES
EKG P AXIS: 75 DEGREES
EKG P-R INTERVAL: 130 MS
EKG P-R INTERVAL: 156 MS
EKG P-R INTERVAL: 156 MS
EKG Q-T INTERVAL: 374 MS
EKG Q-T INTERVAL: 396 MS
EKG Q-T INTERVAL: 398 MS
EKG QRS DURATION: 114 MS
EKG QRS DURATION: 142 MS
EKG QRS DURATION: 148 MS
EKG QTC CALCULATION (BAZETT): 503 MS
EKG QTC CALCULATION (BAZETT): 518 MS
EKG QTC CALCULATION (BAZETT): 518 MS
EKG R AXIS: 72 DEGREES
EKG R AXIS: 73 DEGREES
EKG R AXIS: 81 DEGREES
EKG T AXIS: 37 DEGREES
EKG T AXIS: 42 DEGREES
EKG T AXIS: 50 DEGREES
EKG VENTRICULAR RATE: 102 BPM
EKG VENTRICULAR RATE: 103 BPM
EKG VENTRICULAR RATE: 109 BPM
EOSINOPHIL # BLD: 0 %
EOSINOPHIL # BLD: 0 %
EOSINOPHIL # BLD: 0.3 %
EOSINOPHIL SMEAR: NORMAL
EOSINOPHILS ABSOLUTE: 0 THOU/MM3 (ref 0–0.4)
EPITHELIAL CELLS, UA: ABNORMAL /HPF
ERYTHROCYTE [DISTWIDTH] IN BLOOD BY AUTOMATED COUNT: 11.7 % (ref 11.5–14.5)
ERYTHROCYTE [DISTWIDTH] IN BLOOD BY AUTOMATED COUNT: 11.8 % (ref 11.5–14.5)
ERYTHROCYTE [DISTWIDTH] IN BLOOD BY AUTOMATED COUNT: 11.9 % (ref 11.5–14.5)
ERYTHROCYTE [DISTWIDTH] IN BLOOD BY AUTOMATED COUNT: 40.3 FL (ref 35–45)
ERYTHROCYTE [DISTWIDTH] IN BLOOD BY AUTOMATED COUNT: 40.4 FL (ref 35–45)
ERYTHROCYTE [DISTWIDTH] IN BLOOD BY AUTOMATED COUNT: 42 FL (ref 35–45)
GFR SERPL CREATININE-BSD FRML MDRD: 18 ML/MIN/1.73M2
GFR SERPL CREATININE-BSD FRML MDRD: 40 ML/MIN/1.73M2
GFR SERPL CREATININE-BSD FRML MDRD: > 90 ML/MIN/1.73M2
GLUCOSE BLD-MCNC: 109 MG/DL (ref 70–108)
GLUCOSE BLD-MCNC: 111 MG/DL (ref 70–108)
GLUCOSE BLD-MCNC: 115 MG/DL (ref 70–108)
GLUCOSE BLD-MCNC: 132 MG/DL (ref 70–108)
GLUCOSE BLD-MCNC: 134 MG/DL (ref 70–108)
GLUCOSE BLD-MCNC: 145 MG/DL (ref 70–108)
GLUCOSE BLD-MCNC: 146 MG/DL (ref 70–108)
GLUCOSE BLD-MCNC: 146 MG/DL (ref 70–108)
GLUCOSE BLD-MCNC: 149 MG/DL (ref 70–108)
GLUCOSE BLD-MCNC: 151 MG/DL (ref 70–108)
GLUCOSE BLD-MCNC: 156 MG/DL (ref 70–108)
GLUCOSE BLD-MCNC: 156 MG/DL (ref 70–108)
GLUCOSE BLD-MCNC: 158 MG/DL (ref 70–108)
GLUCOSE BLD-MCNC: 164 MG/DL (ref 70–108)
GLUCOSE BLD-MCNC: 167 MG/DL (ref 70–108)
GLUCOSE BLD-MCNC: 191 MG/DL (ref 70–108)
GLUCOSE BLD-MCNC: 233 MG/DL (ref 70–108)
GLUCOSE BLD-MCNC: 245 MG/DL (ref 70–108)
GLUCOSE, URINE: NEGATIVE MG/DL
GLUCOSE, WHOLE BLOOD: 178 MG/DL (ref 70–108)
GRAM STAIN RESULT: ABNORMAL
HCO3: 26 MMOL/L (ref 23–28)
HCO3: 26 MMOL/L (ref 23–28)
HCO3: 27 MMOL/L (ref 23–28)
HCO3: 28 MMOL/L (ref 23–28)
HCO3: 29 MMOL/L (ref 23–28)
HCT VFR BLD CALC: 30.6 % (ref 42–52)
HCT VFR BLD CALC: 31.1 % (ref 42–52)
HCT VFR BLD CALC: 37.4 % (ref 42–52)
HEMOGLOBIN: 10.1 GM/DL (ref 14–18)
HEMOGLOBIN: 10.4 GM/DL (ref 14–18)
HEMOGLOBIN: 12.8 GM/DL (ref 14–18)
IFIO2: 100
IMMATURE GRANS (ABS): 0.29 THOU/MM3 (ref 0–0.07)
IMMATURE GRANS (ABS): 0.34 THOU/MM3 (ref 0–0.07)
IMMATURE GRANS (ABS): 1.01 THOU/MM3 (ref 0–0.07)
IMMATURE GRANULOCYTES: 1.4 %
IMMATURE GRANULOCYTES: 1.8 %
IMMATURE GRANULOCYTES: 3.9 %
KETONES, URINE: NEGATIVE
LEUKOCYTE ESTERASE, URINE: ABNORMAL
LYMPHOCYTES # BLD: 3.2 %
LYMPHOCYTES # BLD: 3.4 %
LYMPHOCYTES # BLD: 4.9 %
LYMPHOCYTES ABSOLUTE: 0.8 THOU/MM3 (ref 1–4.8)
LYMPHOCYTES ABSOLUTE: 0.8 THOU/MM3 (ref 1–4.8)
LYMPHOCYTES ABSOLUTE: 0.9 THOU/MM3 (ref 1–4.8)
MAGNESIUM: 2.9 MG/DL (ref 1.6–2.4)
MCH RBC QN AUTO: 32.1 PG (ref 26–33)
MCH RBC QN AUTO: 32.4 PG (ref 26–33)
MCH RBC QN AUTO: 32.4 PG (ref 26–33)
MCHC RBC AUTO-ENTMCNC: 32.5 GM/DL (ref 32.2–35.5)
MCHC RBC AUTO-ENTMCNC: 34 GM/DL (ref 32.2–35.5)
MCHC RBC AUTO-ENTMCNC: 34.2 GM/DL (ref 32.2–35.5)
MCV RBC AUTO: 94.4 FL (ref 80–94)
MCV RBC AUTO: 94.7 FL (ref 80–94)
MCV RBC AUTO: 99.7 FL (ref 80–94)
MISCELLANEOUS LAB TEST RESULT: ABNORMAL
MODE: ABNORMAL
MONOCYTES # BLD: 7.4 %
MONOCYTES # BLD: 7.6 %
MONOCYTES # BLD: 7.9 %
MONOCYTES ABSOLUTE: 1.2 THOU/MM3 (ref 0.4–1.3)
MONOCYTES ABSOLUTE: 1.9 THOU/MM3 (ref 0.4–1.3)
MONOCYTES ABSOLUTE: 2.1 THOU/MM3 (ref 0.4–1.3)
NITRITE, URINE: NEGATIVE
NUCLEATED RED BLOOD CELLS: 0 /100 WBC
O2 SATURATION: 31 %
O2 SATURATION: 62 %
O2 SATURATION: 65 %
O2 SATURATION: 94 %
O2 SATURATION: 96 %
ORGANISM: ABNORMAL
ORGANISM: ABNORMAL
PATHOLOGIST REVIEW: ABNORMAL
PCO2 (TEMP CORRECTED): 115 (ref 35–45)
PCO2 (TEMP CORRECTED): 51 (ref 35–45)
PCO2 (TEMP CORRECTED): 56 (ref 35–45)
PCO2: 116 MMHG (ref 35–45)
PCO2: 137 MMHG (ref 35–45)
PCO2: 51 MMHG (ref 35–45)
PCO2: 53 MMHG (ref 35–45)
PCO2: 98 MMHG (ref 35–45)
PH (TEMPERATURE CORRECTED): 6.96 (ref 7.35–7.45)
PH (TEMPERATURE CORRECTED): 7.32 (ref 7.35–7.45)
PH (TEMPERATURE CORRECTED): 7.33 (ref 7.35–7.45)
PH BLOOD GAS: 6.92 (ref 7.35–7.45)
PH BLOOD GAS: 6.96 (ref 7.35–7.45)
PH BLOOD GAS: 7.03 (ref 7.35–7.45)
PH BLOOD GAS: 7.33 (ref 7.35–7.45)
PH BLOOD GAS: 7.34 (ref 7.35–7.45)
PH UA: 5 (ref 5–9)
PLATELET # BLD: 191 THOU/MM3 (ref 130–400)
PLATELET # BLD: 285 THOU/MM3 (ref 130–400)
PLATELET # BLD: 302 THOU/MM3 (ref 130–400)
PLATELET ESTIMATE: ADEQUATE
PMV BLD AUTO: 10.1 FL (ref 9.4–12.4)
PMV BLD AUTO: 10.4 FL (ref 9.4–12.4)
PMV BLD AUTO: 10.4 FL (ref 9.4–12.4)
PO2 (TEMP CORRECTED): 55 (ref 71–104)
PO2 (TEMP CORRECTED): 78 (ref 71–104)
PO2 (TEMP CORRECTED): 94 (ref 71–104)
PO2: 34 MMHG (ref 71–104)
PO2: 48 MMHG (ref 71–104)
PO2: 55 MMHG (ref 71–104)
PO2: 76 MMHG (ref 71–104)
PO2: 87 MMHG (ref 71–104)
POC CREATININE WHOLE BLOOD: 4.1 MG/DL (ref 0.5–1.2)
POC LACTIC ACID: 2.8 MMOL/L (ref 0.5–1.9)
POTASSIUM SERPL-SCNC: 4.9 MEQ/L (ref 3.5–5.2)
POTASSIUM SERPL-SCNC: 5.6 MEQ/L (ref 3.5–5.2)
POTASSIUM SERPL-SCNC: 7.3 MEQ/L (ref 3.5–5.2)
POTASSIUM, WHOLE BLOOD: 7 MEQ/L (ref 3.5–4.9)
PROCALCITONIN: 0.21 NG/ML (ref 0.01–0.09)
PROCALCITONIN: 1.62 NG/ML (ref 0.01–0.09)
PROT/CREAT RATIO, UR: 1.32
PROTEIN UA: 100 MG/DL
PROTEIN, URINE: 151.5 MG/DL
RBC # BLD: 3.12 MILL/MM3 (ref 4.7–6.1)
RBC # BLD: 3.24 MILL/MM3 (ref 4.7–6.1)
RBC # BLD: 3.95 MILL/MM3 (ref 4.7–6.1)
RBC URINE: ABNORMAL /HPF
RENAL EPITHELIAL, UA: ABNORMAL
RESPIRATORY CULTURE: ABNORMAL
SCAN OF BLOOD SMEAR: NORMAL
SEG NEUTROPHILS: 84.5 %
SEG NEUTROPHILS: 85.4 %
SEG NEUTROPHILS: 87.6 %
SEGMENTED NEUTROPHILS ABSOLUTE COUNT: 13.6 THOU/MM3 (ref 1.8–7.7)
SEGMENTED NEUTROPHILS ABSOLUTE COUNT: 21.4 THOU/MM3 (ref 1.8–7.7)
SEGMENTED NEUTROPHILS ABSOLUTE COUNT: 22 THOU/MM3 (ref 1.8–7.7)
SET PEEP: 10 MMHG
SET PEEP: 18 MMHG
SET PRESS SUPP: 20 CMH2O
SET PRESS SUPP: 24 CMH2O
SET RESPIRATORY RATE: 20 BPM
SET RESPIRATORY RATE: 20 BPM
SODIUM BLD-SCNC: 125 MEQ/L (ref 135–145)
SODIUM BLD-SCNC: 127 MEQ/L (ref 135–145)
SODIUM BLD-SCNC: 127 MEQ/L (ref 135–145)
SODIUM URINE: < 20 MEQ/L
SODIUM, WHOLE BLOOD: 129 MEQ/L (ref 138–146)
SOURCE, BLOOD GAS: ABNORMAL
SPECIFIC GRAVITY UA: 1.02 (ref 1–1.03)
SPECIMEN: NORMAL
TEMPERATURE: 36.9
TEMPERATURE: 37.3
TEMPERATURE: 38.3
TRIGL SERPL-MCNC: 258 MG/DL (ref 0–199)
TRIGL SERPL-MCNC: 419 MG/DL (ref 0–199)
UROBILINOGEN, URINE: 1 EU/DL (ref 0–1)
WBC # BLD: 15.9 THOU/MM3 (ref 4.8–10.8)
WBC # BLD: 24.4 THOU/MM3 (ref 4.8–10.8)
WBC # BLD: 26 THOU/MM3 (ref 4.8–10.8)
WBC UA: ABNORMAL /HPF
YEAST: ABNORMAL

## 2022-01-01 PROCEDURE — 99291 CRITICAL CARE FIRST HOUR: CPT | Performed by: INTERNAL MEDICINE

## 2022-01-01 PROCEDURE — 82947 ASSAY GLUCOSE BLOOD QUANT: CPT

## 2022-01-01 PROCEDURE — 82565 ASSAY OF CREATININE: CPT

## 2022-01-01 PROCEDURE — 76770 US EXAM ABDO BACK WALL COMP: CPT

## 2022-01-01 PROCEDURE — 6360000002 HC RX W HCPCS: Performed by: INTERNAL MEDICINE

## 2022-01-01 PROCEDURE — 82803 BLOOD GASES ANY COMBINATION: CPT

## 2022-01-01 PROCEDURE — 6370000000 HC RX 637 (ALT 250 FOR IP): Performed by: NURSE PRACTITIONER

## 2022-01-01 PROCEDURE — 2500000003 HC RX 250 WO HCPCS: Performed by: INTERNAL MEDICINE

## 2022-01-01 PROCEDURE — 6370000000 HC RX 637 (ALT 250 FOR IP): Performed by: INTERNAL MEDICINE

## 2022-01-01 PROCEDURE — 2580000003 HC RX 258: Performed by: INTERNAL MEDICINE

## 2022-01-01 PROCEDURE — 84145 PROCALCITONIN (PCT): CPT

## 2022-01-01 PROCEDURE — 71045 X-RAY EXAM CHEST 1 VIEW: CPT

## 2022-01-01 PROCEDURE — 94003 VENT MGMT INPAT SUBQ DAY: CPT

## 2022-01-01 PROCEDURE — 2580000003 HC RX 258: Performed by: NURSE PRACTITIONER

## 2022-01-01 PROCEDURE — 85025 COMPLETE CBC W/AUTO DIFF WBC: CPT

## 2022-01-01 PROCEDURE — 2500000003 HC RX 250 WO HCPCS: Performed by: NURSE PRACTITIONER

## 2022-01-01 PROCEDURE — 93010 ELECTROCARDIOGRAM REPORT: CPT | Performed by: INTERNAL MEDICINE

## 2022-01-01 PROCEDURE — 90945 DIALYSIS ONE EVALUATION: CPT

## 2022-01-01 PROCEDURE — 36556 INSERT NON-TUNNEL CV CATH: CPT | Performed by: NURSE PRACTITIONER

## 2022-01-01 PROCEDURE — 36556 INSERT NON-TUNNEL CV CATH: CPT

## 2022-01-01 PROCEDURE — 36600 WITHDRAWAL OF ARTERIAL BLOOD: CPT

## 2022-01-01 PROCEDURE — 84132 ASSAY OF SERUM POTASSIUM: CPT

## 2022-01-01 PROCEDURE — 99233 SBSQ HOSP IP/OBS HIGH 50: CPT | Performed by: INTERNAL MEDICINE

## 2022-01-01 PROCEDURE — 6360000002 HC RX W HCPCS: Performed by: NURSE PRACTITIONER

## 2022-01-01 PROCEDURE — 93005 ELECTROCARDIOGRAM TRACING: CPT | Performed by: NURSE PRACTITIONER

## 2022-01-01 PROCEDURE — 82948 REAGENT STRIP/BLOOD GLUCOSE: CPT

## 2022-01-01 PROCEDURE — 81001 URINALYSIS AUTO W/SCOPE: CPT

## 2022-01-01 PROCEDURE — 36415 COLL VENOUS BLD VENIPUNCTURE: CPT

## 2022-01-01 PROCEDURE — XW0DXM6 INTRODUCTION OF BARICITINIB INTO MOUTH AND PHARYNX, EXTERNAL APPROACH, NEW TECHNOLOGY GROUP 6: ICD-10-PCS | Performed by: INTERNAL MEDICINE

## 2022-01-01 PROCEDURE — 86140 C-REACTIVE PROTEIN: CPT

## 2022-01-01 PROCEDURE — 80048 BASIC METABOLIC PNL TOTAL CA: CPT

## 2022-01-01 PROCEDURE — 6360000002 HC RX W HCPCS: Performed by: STUDENT IN AN ORGANIZED HEALTH CARE EDUCATION/TRAINING PROGRAM

## 2022-01-01 PROCEDURE — 2100000000 HC CCU R&B

## 2022-01-01 PROCEDURE — 6370000000 HC RX 637 (ALT 250 FOR IP): Performed by: FAMILY MEDICINE

## 2022-01-01 PROCEDURE — 93005 ELECTROCARDIOGRAM TRACING: CPT | Performed by: STUDENT IN AN ORGANIZED HEALTH CARE EDUCATION/TRAINING PROGRAM

## 2022-01-01 PROCEDURE — 93005 ELECTROCARDIOGRAM TRACING: CPT | Performed by: INTERNAL MEDICINE

## 2022-01-01 PROCEDURE — 83735 ASSAY OF MAGNESIUM: CPT

## 2022-01-01 PROCEDURE — 99239 HOSP IP/OBS DSCHRG MGMT >30: CPT | Performed by: NURSE PRACTITIONER

## 2022-01-01 PROCEDURE — 82436 ASSAY OF URINE CHLORIDE: CPT

## 2022-01-01 PROCEDURE — 84295 ASSAY OF SERUM SODIUM: CPT

## 2022-01-01 PROCEDURE — 94644 CONT INHLJ TX 1ST HOUR: CPT

## 2022-01-01 PROCEDURE — 83605 ASSAY OF LACTIC ACID: CPT

## 2022-01-01 PROCEDURE — 82330 ASSAY OF CALCIUM: CPT

## 2022-01-01 PROCEDURE — 36620 INSERTION CATHETER ARTERY: CPT | Performed by: NURSE PRACTITIONER

## 2022-01-01 PROCEDURE — 99223 1ST HOSP IP/OBS HIGH 75: CPT | Performed by: INTERNAL MEDICINE

## 2022-01-01 PROCEDURE — 84156 ASSAY OF PROTEIN URINE: CPT

## 2022-01-01 PROCEDURE — 84300 ASSAY OF URINE SODIUM: CPT

## 2022-01-01 PROCEDURE — 84478 ASSAY OF TRIGLYCERIDES: CPT

## 2022-01-01 PROCEDURE — 82435 ASSAY OF BLOOD CHLORIDE: CPT

## 2022-01-01 PROCEDURE — 89190 NASAL SMEAR FOR EOSINOPHILS: CPT

## 2022-01-01 PROCEDURE — 82570 ASSAY OF URINE CREATININE: CPT

## 2022-01-01 PROCEDURE — 2580000003 HC RX 258: Performed by: STUDENT IN AN ORGANIZED HEALTH CARE EDUCATION/TRAINING PROGRAM

## 2022-01-01 RX ORDER — CALCIUM GLUCONATE 94 MG/ML
1000 INJECTION, SOLUTION INTRAVENOUS ONCE
Status: COMPLETED | OUTPATIENT
Start: 2022-01-01 | End: 2022-01-01

## 2022-01-01 RX ORDER — DEXAMETHASONE SODIUM PHOSPHATE 4 MG/ML
10 INJECTION, SOLUTION INTRA-ARTICULAR; INTRALESIONAL; INTRAMUSCULAR; INTRAVENOUS; SOFT TISSUE EVERY 24 HOURS
Status: DISCONTINUED | OUTPATIENT
Start: 2022-01-01 | End: 2022-01-04 | Stop reason: HOSPADM

## 2022-01-01 RX ORDER — MINERAL OIL AND WHITE PETROLATUM 150; 830 MG/G; MG/G
OINTMENT OPHTHALMIC PRN
Status: DISCONTINUED | OUTPATIENT
Start: 2022-01-01 | End: 2022-01-04 | Stop reason: HOSPADM

## 2022-01-01 RX ORDER — SODIUM CHLORIDE 9 MG/ML
INJECTION, SOLUTION INTRAVENOUS CONTINUOUS
Status: DISCONTINUED | OUTPATIENT
Start: 2022-01-01 | End: 2022-01-04 | Stop reason: HOSPADM

## 2022-01-01 RX ORDER — DEXTROSE MONOHYDRATE 25 G/50ML
25 INJECTION, SOLUTION INTRAVENOUS ONCE
Status: COMPLETED | OUTPATIENT
Start: 2022-01-01 | End: 2022-01-01

## 2022-01-01 RX ORDER — CALCIUM GLUCONATE 94 MG/ML
1000 INJECTION, SOLUTION INTRAVENOUS ONCE
Status: DISCONTINUED | OUTPATIENT
Start: 2022-01-01 | End: 2022-01-04 | Stop reason: HOSPADM

## 2022-01-01 RX ORDER — CALCIUM CHLORIDE, MAGNESIUM CHLORIDE, DEXTROSE MONOHYDRATE, LACTIC ACID, SODIUM CHLORIDE, SODIUM BICARBONATE AND POTASSIUM CHLORIDE 5.15; 2.03; 22; 5.4; 6.46; 3.09; .157 G/L; G/L; G/L; G/L; G/L; G/L; G/L
INJECTION INTRAVENOUS CONTINUOUS
Status: DISCONTINUED | OUTPATIENT
Start: 2022-01-01 | End: 2022-01-01

## 2022-01-01 RX ORDER — DEXTROSE MONOHYDRATE 25 G/50ML
25 INJECTION, SOLUTION INTRAVENOUS ONCE
Status: DISCONTINUED | OUTPATIENT
Start: 2022-01-01 | End: 2022-01-04 | Stop reason: HOSPADM

## 2022-01-01 RX ORDER — CALCIUM GLUCONATE 94 MG/ML
INJECTION, SOLUTION INTRAVENOUS
Status: DISCONTINUED
Start: 2022-01-01 | End: 2022-01-04 | Stop reason: HOSPADM

## 2022-01-01 RX ORDER — SODIUM POLYSTYRENE SULFONATE 15 G/60ML
30 SUSPENSION ORAL; RECTAL ONCE
Status: COMPLETED | OUTPATIENT
Start: 2022-01-01 | End: 2022-01-01

## 2022-01-01 RX ORDER — DEXTROSE MONOHYDRATE 25 G/50ML
25 INJECTION, SOLUTION INTRAVENOUS ONCE
Status: CANCELLED | OUTPATIENT
Start: 2022-01-01 | End: 2022-01-01

## 2022-01-01 RX ADMIN — DEXTROSE MONOHYDRATE 25 G: 25 INJECTION, SOLUTION INTRAVENOUS at 07:48

## 2022-01-01 RX ADMIN — SODIUM CHLORIDE: 9 INJECTION, SOLUTION INTRAVENOUS at 16:36

## 2022-01-01 RX ADMIN — INSULIN LISPRO 1 UNITS: 100 INJECTION, SOLUTION INTRAVENOUS; SUBCUTANEOUS at 21:03

## 2022-01-01 RX ADMIN — ACETAMINOPHEN 650 MG: 325 TABLET ORAL at 01:12

## 2022-01-01 RX ADMIN — SODIUM CHLORIDE, PRESERVATIVE FREE 10 ML: 5 INJECTION INTRAVENOUS at 08:13

## 2022-01-01 RX ADMIN — 0.12% CHLORHEXIDINE GLUCONATE 15 ML: 1.2 RINSE ORAL at 21:04

## 2022-01-01 RX ADMIN — PROPOFOL 49.96 MCG/KG/MIN: 10 INJECTION, EMULSION INTRAVENOUS at 00:04

## 2022-01-01 RX ADMIN — Medication 1000 UNITS: at 08:05

## 2022-01-01 RX ADMIN — LEVOTHYROXINE SODIUM 50 MCG: 0.05 TABLET ORAL at 06:47

## 2022-01-01 RX ADMIN — SODIUM ZIRCONIUM CYCLOSILICATE 10 G: 5 POWDER, FOR SUSPENSION ORAL at 15:18

## 2022-01-01 RX ADMIN — SODIUM CHLORIDE: 9 INJECTION, SOLUTION INTRAVENOUS at 14:46

## 2022-01-01 RX ADMIN — CISATRACURIUM BESYLATE 2 MCG/KG/MIN: 10 INJECTION INTRAVENOUS at 17:18

## 2022-01-01 RX ADMIN — Medication 1 CAPSULE: at 08:01

## 2022-01-01 RX ADMIN — CLOPIDOGREL BISULFATE 75 MG: 75 TABLET, FILM COATED ORAL at 08:04

## 2022-01-01 RX ADMIN — POLYETHYLENE GLYCOL 3350 17 G: 17 POWDER, FOR SOLUTION ORAL at 08:11

## 2022-01-01 RX ADMIN — NAFCILLIN SODIUM 1000 MG: 2 POWDER, FOR SOLUTION INTRAMUSCULAR; INTRAVENOUS at 15:28

## 2022-01-01 RX ADMIN — CISATRACURIUM BESYLATE 3.5 MCG/KG/MIN: 10 INJECTION INTRAVENOUS at 16:11

## 2022-01-01 RX ADMIN — ATORVASTATIN CALCIUM 40 MG: 40 TABLET, FILM COATED ORAL at 21:39

## 2022-01-01 RX ADMIN — INSULIN LISPRO 2 UNITS: 100 INJECTION, SOLUTION INTRAVENOUS; SUBCUTANEOUS at 08:22

## 2022-01-01 RX ADMIN — SODIUM CHLORIDE, PRESERVATIVE FREE 10 ML: 5 INJECTION INTRAVENOUS at 21:05

## 2022-01-01 RX ADMIN — ATORVASTATIN CALCIUM 40 MG: 40 TABLET, FILM COATED ORAL at 21:04

## 2022-01-01 RX ADMIN — 0.12% CHLORHEXIDINE GLUCONATE 15 ML: 1.2 RINSE ORAL at 09:13

## 2022-01-01 RX ADMIN — BARICITINIB 4 MG: 2 TABLET, FILM COATED ORAL at 08:35

## 2022-01-01 RX ADMIN — CISATRACURIUM BESYLATE 3 MCG/KG/MIN: 10 INJECTION INTRAVENOUS at 21:01

## 2022-01-01 RX ADMIN — METOPROLOL TARTRATE 12.5 MG: 25 TABLET, FILM COATED ORAL at 08:39

## 2022-01-01 RX ADMIN — METOPROLOL TARTRATE 2.5 MG: 5 INJECTION INTRAVENOUS at 00:24

## 2022-01-01 RX ADMIN — FENTANYL CITRATE 150 MCG/HR: 50 INJECTION, SOLUTION INTRAMUSCULAR; INTRAVENOUS at 06:04

## 2022-01-01 RX ADMIN — ENOXAPARIN SODIUM 80 MG: 100 INJECTION SUBCUTANEOUS at 15:05

## 2022-01-01 RX ADMIN — PROPOFOL 40 MCG/KG/MIN: 10 INJECTION, EMULSION INTRAVENOUS at 18:39

## 2022-01-01 RX ADMIN — FAMOTIDINE 20 MG: 10 INJECTION, SOLUTION INTRAVENOUS at 08:40

## 2022-01-01 RX ADMIN — NAFCILLIN SODIUM 1000 MG: 2 POWDER, FOR SOLUTION INTRAMUSCULAR; INTRAVENOUS at 23:36

## 2022-01-01 RX ADMIN — Medication: at 18:26

## 2022-01-01 RX ADMIN — PROPOFOL 50 MCG/KG/MIN: 10 INJECTION, EMULSION INTRAVENOUS at 18:15

## 2022-01-01 RX ADMIN — Medication 8.8 MG: at 21:39

## 2022-01-01 RX ADMIN — SODIUM ZIRCONIUM CYCLOSILICATE 10 G: 5 POWDER, FOR SUSPENSION ORAL at 21:39

## 2022-01-01 RX ADMIN — Medication 8 MG/HR: at 00:09

## 2022-01-01 RX ADMIN — FENTANYL CITRATE 125 MCG/HR: 50 INJECTION, SOLUTION INTRAMUSCULAR; INTRAVENOUS at 14:50

## 2022-01-01 RX ADMIN — ENOXAPARIN SODIUM 80 MG: 100 INJECTION SUBCUTANEOUS at 13:47

## 2022-01-01 RX ADMIN — Medication 8 MG/HR: at 12:08

## 2022-01-01 RX ADMIN — DEXAMETHASONE SODIUM PHOSPHATE 10 MG: 4 INJECTION, SOLUTION INTRAMUSCULAR; INTRAVENOUS at 15:18

## 2022-01-01 RX ADMIN — Medication 50 MG: at 08:01

## 2022-01-01 RX ADMIN — PROPOFOL 50 MCG/KG/MIN: 10 INJECTION, EMULSION INTRAVENOUS at 09:16

## 2022-01-01 RX ADMIN — INSULIN LISPRO 1 UNITS: 100 INJECTION, SOLUTION INTRAVENOUS; SUBCUTANEOUS at 03:43

## 2022-01-01 RX ADMIN — FENTANYL CITRATE 150 MCG/HR: 50 INJECTION, SOLUTION INTRAMUSCULAR; INTRAVENOUS at 14:48

## 2022-01-01 RX ADMIN — PROPOFOL 50 MCG/KG/MIN: 10 INJECTION, EMULSION INTRAVENOUS at 08:12

## 2022-01-01 RX ADMIN — DEXAMETHASONE SODIUM PHOSPHATE 10 MG: 4 INJECTION, SOLUTION INTRAMUSCULAR; INTRAVENOUS at 15:19

## 2022-01-01 RX ADMIN — ACETAMINOPHEN 650 MG: 325 TABLET ORAL at 08:00

## 2022-01-01 RX ADMIN — HEPARIN SODIUM 83 ML/HR: 5000 INJECTION INTRAVENOUS; SUBCUTANEOUS at 13:21

## 2022-01-01 RX ADMIN — CISATRACURIUM BESYLATE 2.5 MCG/KG/MIN: 10 INJECTION INTRAVENOUS at 07:26

## 2022-01-01 RX ADMIN — BARICITINIB 2 MG: 2 TABLET, FILM COATED ORAL at 08:05

## 2022-01-01 RX ADMIN — NAFCILLIN SODIUM 1000 MG: 2 POWDER, FOR SOLUTION INTRAMUSCULAR; INTRAVENOUS at 06:46

## 2022-01-01 RX ADMIN — BARICITINIB 4 MG: 2 TABLET, FILM COATED ORAL at 08:01

## 2022-01-01 RX ADMIN — NAFCILLIN SODIUM 1000 MG: 2 POWDER, FOR SOLUTION INTRAMUSCULAR; INTRAVENOUS at 15:19

## 2022-01-01 RX ADMIN — 0.12% CHLORHEXIDINE GLUCONATE 15 ML: 1.2 RINSE ORAL at 08:05

## 2022-01-01 RX ADMIN — FENTANYL CITRATE 150 MCG/HR: 50 INJECTION, SOLUTION INTRAMUSCULAR; INTRAVENOUS at 12:06

## 2022-01-01 RX ADMIN — Medication: at 13:28

## 2022-01-01 RX ADMIN — PROPOFOL 49.96 MCG/KG/MIN: 10 INJECTION, EMULSION INTRAVENOUS at 04:52

## 2022-01-01 RX ADMIN — FAMOTIDINE 20 MG: 10 INJECTION, SOLUTION INTRAVENOUS at 08:11

## 2022-01-01 RX ADMIN — ACETAMINOPHEN 650 MG: 325 TABLET ORAL at 12:23

## 2022-01-01 RX ADMIN — LEVOTHYROXINE SODIUM 50 MCG: 0.05 TABLET ORAL at 08:38

## 2022-01-01 RX ADMIN — FAMOTIDINE 20 MG: 10 INJECTION, SOLUTION INTRAVENOUS at 21:04

## 2022-01-01 RX ADMIN — Medication 1000 UNITS: at 08:35

## 2022-01-01 RX ADMIN — SODIUM CHLORIDE, PRESERVATIVE FREE 10 ML: 5 INJECTION INTRAVENOUS at 08:40

## 2022-01-01 RX ADMIN — 0.12% CHLORHEXIDINE GLUCONATE 15 ML: 1.2 RINSE ORAL at 22:08

## 2022-01-01 RX ADMIN — NAFCILLIN SODIUM 1000 MG: 2 POWDER, FOR SOLUTION INTRAMUSCULAR; INTRAVENOUS at 19:52

## 2022-01-01 RX ADMIN — NAFCILLIN SODIUM 1000 MG: 2 POWDER, FOR SOLUTION INTRAMUSCULAR; INTRAVENOUS at 11:39

## 2022-01-01 RX ADMIN — Medication 50 MG: at 08:05

## 2022-01-01 RX ADMIN — POLYETHYLENE GLYCOL 3350 17 G: 17 POWDER, FOR SOLUTION ORAL at 08:01

## 2022-01-01 RX ADMIN — NAFCILLIN SODIUM 1000 MG: 2 POWDER, FOR SOLUTION INTRAMUSCULAR; INTRAVENOUS at 10:23

## 2022-01-01 RX ADMIN — ENOXAPARIN SODIUM 80 MG: 100 INJECTION SUBCUTANEOUS at 02:57

## 2022-01-01 RX ADMIN — PROPOFOL 50 MCG/KG/MIN: 10 INJECTION, EMULSION INTRAVENOUS at 09:41

## 2022-01-01 RX ADMIN — PROPOFOL 49.96 MCG/KG/MIN: 10 INJECTION, EMULSION INTRAVENOUS at 20:14

## 2022-01-01 RX ADMIN — OXYCODONE HYDROCHLORIDE AND ACETAMINOPHEN 500 MG: 500 TABLET ORAL at 08:05

## 2022-01-01 RX ADMIN — PROPOFOL 50 MCG/KG/MIN: 10 INJECTION, EMULSION INTRAVENOUS at 14:09

## 2022-01-01 RX ADMIN — FAMOTIDINE 20 MG: 10 INJECTION, SOLUTION INTRAVENOUS at 08:13

## 2022-01-01 RX ADMIN — Medication 12.05 MCG/MIN: at 00:11

## 2022-01-01 RX ADMIN — CISATRACURIUM BESYLATE 3.5 MCG/KG/MIN: 10 INJECTION INTRAVENOUS at 03:53

## 2022-01-01 RX ADMIN — PROPOFOL 50 MCG/KG/MIN: 10 INJECTION, EMULSION INTRAVENOUS at 14:36

## 2022-01-01 RX ADMIN — Medication 8 MG/HR: at 13:35

## 2022-01-01 RX ADMIN — Medication 8.8 MG: at 21:04

## 2022-01-01 RX ADMIN — FENTANYL CITRATE 150 MCG/HR: 50 INJECTION, SOLUTION INTRAMUSCULAR; INTRAVENOUS at 21:39

## 2022-01-01 RX ADMIN — INSULIN LISPRO 1 UNITS: 100 INJECTION, SOLUTION INTRAVENOUS; SUBCUTANEOUS at 14:52

## 2022-01-01 RX ADMIN — Medication 1 CAPSULE: at 08:04

## 2022-01-01 RX ADMIN — NAFCILLIN SODIUM 1000 MG: 2 POWDER, FOR SOLUTION INTRAMUSCULAR; INTRAVENOUS at 01:02

## 2022-01-01 RX ADMIN — INSULIN LISPRO 1 UNITS: 100 INJECTION, SOLUTION INTRAVENOUS; SUBCUTANEOUS at 21:54

## 2022-01-01 RX ADMIN — Medication 3 MCG/MIN: at 19:40

## 2022-01-01 RX ADMIN — PROPOFOL 50 MCG/KG/MIN: 10 INJECTION, EMULSION INTRAVENOUS at 16:10

## 2022-01-01 RX ADMIN — Medication 7 MG/HR: at 08:08

## 2022-01-01 RX ADMIN — Medication: at 16:03

## 2022-01-01 RX ADMIN — CLOPIDOGREL BISULFATE 75 MG: 75 TABLET, FILM COATED ORAL at 08:01

## 2022-01-01 RX ADMIN — Medication: at 13:23

## 2022-01-01 RX ADMIN — FAMOTIDINE 20 MG: 10 INJECTION, SOLUTION INTRAVENOUS at 21:39

## 2022-01-01 RX ADMIN — NAFCILLIN SODIUM 1000 MG: 2 POWDER, FOR SOLUTION INTRAMUSCULAR; INTRAVENOUS at 04:50

## 2022-01-01 RX ADMIN — INSULIN HUMAN 10 UNITS: 100 INJECTION, SOLUTION PARENTERAL at 08:41

## 2022-01-01 RX ADMIN — ASPIRIN 81 MG CHEWABLE TABLET 81 MG: 81 TABLET CHEWABLE at 08:11

## 2022-01-01 RX ADMIN — OXYCODONE HYDROCHLORIDE AND ACETAMINOPHEN 500 MG: 500 TABLET ORAL at 08:38

## 2022-01-01 RX ADMIN — NAFCILLIN SODIUM 1000 MG: 2 POWDER, FOR SOLUTION INTRAMUSCULAR; INTRAVENOUS at 12:00

## 2022-01-01 RX ADMIN — ENOXAPARIN SODIUM 80 MG: 100 INJECTION SUBCUTANEOUS at 02:46

## 2022-01-01 RX ADMIN — POLYETHYLENE GLYCOL 3350 17 G: 17 POWDER, FOR SOLUTION ORAL at 08:35

## 2022-01-01 RX ADMIN — ENOXAPARIN SODIUM 80 MG: 100 INJECTION SUBCUTANEOUS at 01:27

## 2022-01-01 RX ADMIN — ENOXAPARIN SODIUM 80 MG: 100 INJECTION SUBCUTANEOUS at 13:52

## 2022-01-01 RX ADMIN — SODIUM BICARBONATE 50 MEQ: 84 INJECTION, SOLUTION INTRAVENOUS at 07:48

## 2022-01-01 RX ADMIN — NAFCILLIN SODIUM 1000 MG: 2 POWDER, FOR SOLUTION INTRAMUSCULAR; INTRAVENOUS at 02:57

## 2022-01-01 RX ADMIN — SODIUM CHLORIDE, PRESERVATIVE FREE 10 ML: 5 INJECTION INTRAVENOUS at 22:08

## 2022-01-01 RX ADMIN — CLOPIDOGREL BISULFATE 75 MG: 75 TABLET, FILM COATED ORAL at 08:38

## 2022-01-01 RX ADMIN — Medication 7 MG/HR: at 22:52

## 2022-01-01 RX ADMIN — LEVOTHYROXINE SODIUM 50 MCG: 0.05 TABLET ORAL at 08:00

## 2022-01-01 RX ADMIN — DEXAMETHASONE SODIUM PHOSPHATE 10 MG: 4 INJECTION, SOLUTION INTRAMUSCULAR; INTRAVENOUS at 16:04

## 2022-01-01 RX ADMIN — Medication 1 CAPSULE: at 08:38

## 2022-01-01 RX ADMIN — POLYVINYL ALCOHOL 1 DROP: 14 SOLUTION/ DROPS OPHTHALMIC at 14:23

## 2022-01-01 RX ADMIN — PROPOFOL 49.96 MCG/KG/MIN: 10 INJECTION, EMULSION INTRAVENOUS at 01:25

## 2022-01-01 RX ADMIN — CALCIUM GLUCONATE 1000 MG: 98 INJECTION, SOLUTION INTRAVENOUS at 07:41

## 2022-01-01 RX ADMIN — PROPOFOL 50 MCG/KG/MIN: 10 INJECTION, EMULSION INTRAVENOUS at 12:44

## 2022-01-01 RX ADMIN — OXYCODONE HYDROCHLORIDE AND ACETAMINOPHEN 500 MG: 500 TABLET ORAL at 08:01

## 2022-01-01 RX ADMIN — FENTANYL CITRATE 125 MCG/HR: 50 INJECTION, SOLUTION INTRAMUSCULAR; INTRAVENOUS at 03:53

## 2022-01-01 RX ADMIN — 0.12% CHLORHEXIDINE GLUCONATE 15 ML: 1.2 RINSE ORAL at 08:01

## 2022-01-01 RX ADMIN — Medication 1000 UNITS: at 08:01

## 2022-01-01 RX ADMIN — HEPARIN SODIUM 83 ML/HR: 5000 INJECTION INTRAVENOUS; SUBCUTANEOUS at 13:20

## 2022-01-01 RX ADMIN — NAFCILLIN SODIUM 1000 MG: 2 POWDER, FOR SOLUTION INTRAMUSCULAR; INTRAVENOUS at 03:42

## 2022-01-01 RX ADMIN — FENTANYL CITRATE 125 MCG/HR: 50 INJECTION, SOLUTION INTRAMUSCULAR; INTRAVENOUS at 01:16

## 2022-01-01 RX ADMIN — ASPIRIN 81 MG CHEWABLE TABLET 81 MG: 81 TABLET CHEWABLE at 08:01

## 2022-01-01 RX ADMIN — NAFCILLIN SODIUM 1000 MG: 2 POWDER, FOR SOLUTION INTRAMUSCULAR; INTRAVENOUS at 07:46

## 2022-01-01 RX ADMIN — NAFCILLIN SODIUM 1000 MG: 2 POWDER, FOR SOLUTION INTRAMUSCULAR; INTRAVENOUS at 08:21

## 2022-01-01 RX ADMIN — INSULIN LISPRO 1 UNITS: 100 INJECTION, SOLUTION INTRAVENOUS; SUBCUTANEOUS at 03:04

## 2022-01-01 RX ADMIN — NAFCILLIN SODIUM 1000 MG: 2 POWDER, FOR SOLUTION INTRAMUSCULAR; INTRAVENOUS at 15:06

## 2022-01-01 RX ADMIN — Medication 50 MG: at 08:38

## 2022-01-01 RX ADMIN — NAFCILLIN SODIUM 1000 MG: 2 POWDER, FOR SOLUTION INTRAMUSCULAR; INTRAVENOUS at 19:37

## 2022-01-01 RX ADMIN — Medication 36 MCG/MIN: at 14:36

## 2022-01-01 RX ADMIN — SODIUM ZIRCONIUM CYCLOSILICATE 10 G: 5 POWDER, FOR SUSPENSION ORAL at 08:35

## 2022-01-01 RX ADMIN — EPOPROSTENOL 50 NG/KG/MIN: 1.5 INJECTION, POWDER, LYOPHILIZED, FOR SOLUTION INTRAVENOUS at 17:48

## 2022-01-01 RX ADMIN — ASPIRIN 81 MG CHEWABLE TABLET 81 MG: 81 TABLET CHEWABLE at 08:35

## 2022-01-01 RX ADMIN — SODIUM POLYSTYRENE SULFONATE 30 G: 15 SUSPENSION ORAL; RECTAL at 07:41

## 2022-01-01 ASSESSMENT — PAIN SCALES - WONG BAKER
WONGBAKER_NUMERICALRESPONSE: 0

## 2022-01-01 ASSESSMENT — PULMONARY FUNCTION TESTS
PIF_VALUE: 44
PIF_VALUE: 44
PIF_VALUE: 31
PIF_VALUE: 35
PIF_VALUE: 38
PIF_VALUE: 45
PIF_VALUE: 34
PIF_VALUE: 35
PIF_VALUE: 34
PIF_VALUE: 43
PIF_VALUE: 35
PIF_VALUE: 36
PIF_VALUE: 36
PIF_VALUE: 37
PIF_VALUE: 35

## 2022-01-01 ASSESSMENT — PAIN SCALES - GENERAL
PAINLEVEL_OUTOF10: 0

## 2022-01-01 NOTE — PROGRESS NOTES
Pt tachycardic at 132. [facility] NP & intensivist. Stated will order more metoprolol.  Electronically signed by Arie Dimas RN on 12/31/2021 at 7:38 PM

## 2022-01-01 NOTE — SIGNIFICANT EVENT
Discussed with family and answered all their question. Tim Cross M.D.   Pulmonary Critical Care Department

## 2022-01-01 NOTE — PROGRESS NOTES
CRITICAL CARE PROGRESS NOTE      Patient:  Kvng Randle    Unit/Bed:3A-06/006-A  YOB: 1970  MRN: [id number]   PCP: Letha Gerco  Date of Admission: 12/21/2021  Chief Complaint:- COVID-19    Assessment and Plan:      1. Acute hypoxic respiratory failure: Requiring intubation after failing high flow nasal cannula and BiPAP on 12/28.  Maintain peak pressures less than 35.  2. COVID-19: Positive on 12/15/2021.  Status post remdesivir at outlying facility.  Continue baricitinib and Decadron therapy. 3. Severe ARDS: PF ratio 63.  Continue pronation therapy. 4. Bacterial pneumonia: Positive for MSSA.  Nafcillin day #5  5. NSTEMI: Cardiology recommendations to wait for cardiac cath until clinically more stable.  Patient was started on aspirin Plavix and full dose Lovenox.  Status post nitro drip.  Echocardiogram within normal limits.  12.5 mg of Lopressor if tolerates. 6. Hypotension: Likely secondary to sedation. No other signs of infection besides pneumonia which is being treated. Monitor. 7. Left pneumothorax: Chest x-ray reveals less than 5% left-sided apical pneumothorax. Monitor. Repeat chest x-ray today with no evidence of pneumothorax. I did explain to nurse to call with any signs of respiratory or hemodynamic compromise. 8.   hyponatremia: mild, monitor.  Serum osmolality 291. Cortisol level pending,  patient appears euvolemic. 8. Mild hyperkalemia: Mild, monitor   9. Impaired glucose tolerance: Added low-dose sliding scale insulin.  Likely secondary to steroid use.  A1c 5.8  10. Leukocytosis: Patient has MSSA.  Continue antibiotic therapy.   Continue to monitor.  Check pro calcitonin     Discussed with family and answered all their question.      INITIAL H AND P AND ICU COURSE:     Abbe Ingram is a 67-year-old white male who presented to [facility] on 12/21/2021 as a transfer from [facility] with complaints of chest pain and elevated troponin with concern for NSTEMI. José Miguel Muller has a past medical history of hypertension, dyslipidemia, nephrolithiasis, irritable bowel bowel syndrome, hypothyroidism.  Per report patient had been hospitalized in [facility] starting 12/15/2021 hypoxic respiratory failure secondary to COVID-19.  On 12/20/2021 he started having chest pain and heaviness nitro drip was started.  Pain did resolve.  Patient did require BiPAP.  [facility] for Cov[facility] on 12/15/2021.  CT was positive for pneumonia but negative for PE.  He did receive Decadron and remdesivir at [facility] facility.  After incidence of chest pain recommendation from [facility] cardiology was to transfer patient to [facility] for cardiac catheterization. José Miguel Muller was evaluated by cardiology on 12/23 at that time cardiology recommended to hold on cardiac catheterization secondary to respiratory failure.  Nitro was weaned off.  On 12/28 he was tachypneic and hypoxic on max BiPAP settings.  He was transferred to  for emergent intubation.     Past Medical History: per HPI  Family History: No known family history  Social History: Non-smoker, denies alcohol or drug use     ROS   Sedated on mechanical ventilation     Scheduled Meds:   sodium zirconium cyclosilicate  10 g Oral TID    dexamethasone  10 mg IntraVENous Q24H    Vitamin D  1,000 Units Oral Daily    zinc sulfate  50 mg Oral Daily    ascorbic acid  500 mg Oral Daily    insulin lispro  0-6 Units SubCUTAneous Q6H    enoxaparin  1 mg/kg SubCUTAneous Q12H    midazolam  4 mg IntraVENous Once    chlorhexidine  15 mL Mouth/Throat BID    famotidine (PEPCID) injection  20 mg IntraVENous BID    sodium chloride flush  5-40 mL IntraVENous 2 times per day    aspirin  81 mg Per NG tube Daily    atorvastatin  40 mg Per NG tube Nightly    baricitinib  4 mg Per NG tube Daily    clopidogrel  75 mg Per NG tube Daily    levothyroxine  50 mcg Per NG tube Daily    nafcillin  1,000 mg IntraVENous Q4H    polyethylene glycol  17 g Per NG tube Daily    lactobacillus  1 capsule Per NG tube Daily with breakfast    senna  5 mL Per NG tube Nightly    metoprolol tartrate  12.5 mg Oral BID    [Held by provider] losartan  25 mg Oral Daily     Continuous Infusions:   fentaNYL (SUBLIMAZE) 1250 mcg in sodium chloride 0.9 % 250 mL 125 mcg/hr (01/01/22 1318)    dextrose      midazolam 7 mg/hr (01/01/22 1318)    sodium chloride      propofol 50 mcg/kg/min (01/01/22 1318)    cisatracurium (NIMBEX) infusion 3.5 mcg/kg/min (01/01/22 1318)    norepinephrine Stopped (01/01/22 0953)       PHYSICAL EXAMINATION:  T:  99.7.  P:  89. RR:  21. B/P:  103/70. FiO2:  100. O2 Sat:  94.  I/O:  1436/800  Body mass index is 27.27 kg/m². PC: 20/18: TV: 541: RRTotal: 20: Ti:1 sec: ETCO2: 32.  General:   Acute on chronically ill appearing white male   HEENT:  normocephalic and atraumatic. No scleral icterus. PERR. Orbital edema   Neck: supple. No Thyromegaly. Lungs: clear to auscultation. No retractions  Cardiac: RRR. No JVD. Abdomen: soft. Nontender. Extremities:  No clubbing, cyanosis, or edema x 4. Vasculature: capillary refill < 3 seconds. Palpable dorsalis pedis pulses. Skin:  warm and dry. Psych:  Sedated on mechanical ventilation   Lymph:  No supraclavicular adenopathy. Neurologic:  No focal deficit. No seizures. Data: (All radiographs, tracings, PFTs, and imaging are personally viewed and interpreted unless otherwise noted).  Sodium 130, potassium 4.9, chloride 95, C02 25, BUN 21, creatinine 0.6, anion gap 10.0, glucose 157.  WBC 17.7, H/H 13.8/40.5, platelets 059   Telemetry shows NSR   Chest xray 12/31/2021 interstitial densities and airspace disease, tiny left apical pneumothorax unchanged.         Meets Continued ICU Level Care Criteria:    [x] Yes   [] No - Transfer Planned to listed location:  [] HOSPITALIST CONTACTED- DR     CC time 35 min     Electronically signed by Abeba Randall MD Haylee  CRITICAL CARE SPECIALIST

## 2022-01-02 NOTE — PROGRESS NOTES
pain and elevated troponin with concern for NSTEMI.  He has a past medical history of hypertension, dyslipidemia, nephrolithiasis, irritable bowel bowel syndrome, hypothyroidism.  Per report patient had been hospitalized in [facility] starting 12/15/2021 hypoxic respiratory failure secondary to COVID-19.  On 12/20/2021 he started having chest pain and heaviness nitro drip was started.  Pain did resolve.  Patient did require BiPAP.  [facility] for Cov[facility] on 12/15/2021.  CT was positive for pneumonia but negative for PE.  He did receive Decadron and remdesivir at [facility] facility.  After incidence of chest pain recommendation from [facility] cardiology was to transfer patient to [facility] for cardiac catheterization. [facility] was evaluated by cardiology on 12/23 at that time cardiology recommended to hold on cardiac catheterization secondary to respiratory failure.  Nitro was weaned off.  On 12/28 he was tachypneic and hypoxic on max BiPAP settings. [facility] was transferred to  for emergent intubation.     Past Medical History: per HPI  Family History: No known family history  Social History: Non-smoker, denies alcohol or drug use     ROS   Sedated on mechanical ventilation     Scheduled Meds:   dexamethasone  10 mg IntraVENous Q24H    Vitamin D  1,000 Units Oral Daily    zinc sulfate  50 mg Oral Daily    ascorbic acid  500 mg Oral Daily    insulin lispro  0-6 Units SubCUTAneous Q6H    enoxaparin  1 mg/kg SubCUTAneous Q12H    midazolam  4 mg IntraVENous Once    chlorhexidine  15 mL Mouth/Throat BID    famotidine (PEPCID) injection  20 mg IntraVENous BID    sodium chloride flush  5-40 mL IntraVENous 2 times per day    aspirin  81 mg Per NG tube Daily    atorvastatin  40 mg Per NG tube Nightly    baricitinib  4 mg Per NG tube Daily    clopidogrel  75 mg Per NG tube Daily    levothyroxine  50 mcg Per NG tube Daily    nafcillin  1,000 mg IntraVENous Q4H  polyethylene glycol  17 g Per NG tube Daily    lactobacillus  1 capsule Per NG tube Daily with breakfast    senna  5 mL Per NG tube Nightly    metoprolol tartrate  12.5 mg Oral BID    [Held by provider] losartan  25 mg Oral Daily     Continuous Infusions:   fentaNYL (SUBLIMAZE) 1250 mcg in sodium chloride 0.9 % 250 mL 125 mcg/hr (01/02/22 0616)    dextrose      midazolam 7 mg/hr (01/02/22 0616)    sodium chloride      propofol 49.958 mcg/kg/min (01/02/22 0616)    cisatracurium (NIMBEX) infusion 2.5 mcg/kg/min (01/02/22 0726)    norepinephrine 1 mcg/min (01/02/22 0616)       PHYSICAL EXAMINATION:  T:  99.1. P:  108. RR:  20. B/P:  111/61. FiO2:  90. O2 Sat:  90  I/O:  3770/810  Body mass index is 27.27 kg/m². PC: 18/18: TV: 495: RRTotal: 20 Ti:1 sec: ETCO2: 38  General:   Acute on chronically ill appearing white male   HEENT:  normocephalic and atraumatic. No scleral icterus. PERR. Orbital edema   Neck: supple. No Thyromegaly. Lungs: clear to auscultation. No retractions  Cardiac: RRR. No JVD. Abdomen: soft. Nontender. Extremities:  No clubbing, cyanosis, or edema x 4. Vasculature: capillary refill < 3 seconds. Palpable dorsalis pedis pulses. Skin:  warm and dry. Psych:  Sedated on mechanical ventilation   Lymph:  No supraclavicular adenopathy. Neurologic:  No focal deficit. No seizures. Data: (All radiographs, tracings, PFTs, and imaging are personally viewed and interpreted unless otherwise noted).  Sodium 127, potassium 4.9 chloride 93, C02 27, BUN 45, creatinine 1.8, anion gap 10.0, glucose 157.  WBC 15.9, H/H 10.4/30.6, platelets 947   Telemetry shows NSR   Chest xray 12/31/2021 interstitial densities and airspace disease, tiny left apical pneumothorax unchanged.  Chest x-ray 1/2/2022 reports persistent moderate severity bilateral opacities.         Meets Continued ICU Level Care Criteria:    [x] Yes   [] No - Transfer Planned to listed location:  [] HOSPITALIST CONTACTED-      Case and plan discussed with Dr. Claudetta Moritz. Electronically signed by Robyn Flores.  DEBORA Tinoco - CNP  CRITICAL CARE SPECIALIST

## 2022-01-02 NOTE — CONSULTS
[facility]    [address]  [facility]  1/2/2022 11:13 AM    Pt Name:    Harper Monae  MRN:     [id number]   [id number]  YOB: 1970  Admit Date:    12/21/2021  5:51 PM  Primary Care Physician:  Madhavi Keys    [facility] Number:   [id number]    Reason for Consult:  Acute kidney injury  Requesting provider:  Ms. Caryn Torres, APRN-CNP    History:   The patient is a 46 y.o. male with history of hypertension, thyroid disorder who was admitted at outside hospital with fatigue, weakness, high fevers and subsequently tested positive for Covid. He was also noted to be hypoxic. CT angiogram showed multifocal pneumonia. He was started on IV Decadron. He was subsequently noted to have elevated troponin. He was transferred to [facility] for further work-up and evaluation. Was evaluated by cardiologist at [facility]. Due to progressive respiratory failure he required intubation and was brought to ICU. Patient also tested positive for MSSA. He was started on nafcillin. Nephrology has been consulted today due to elevated serum creatinine. Creatinine went up to 1.8 today. Currently requiring Levophed. He is on 100% FiO2. He has a Hendrix catheter in place.       Past Medical History:  Hypertension, thyroid disorder    Past Surgical History:  None available    Family History:  Cannot be obtained at this time    Social History:  Social History     Socioeconomic History    Marital status:      Spouse name: Not on file    Number of children: Not on file    Years of education: Not on file    Highest education level: Not on file   Occupational History    Not on file   Tobacco Use    Smoking status: Not on file    Smokeless tobacco: Not on file   Substance and Sexual Activity    Alcohol use: Not on file    Drug use: Not on file    Sexual activity: Not on file   Other Topics Concern    Not on file   Social History Narrative    Not on file     Social Determinants of Health     Financial Resource Strain:     Difficulty of Paying Living Expenses: Not on file   Food Insecurity:     Worried About Running Out of Food in the Last Year: Not on file    Tony of Food in the Last Year: Not on file   Transportation Needs:     Lack of Transportation (Medical): Not on file    Lack of Transportation (Non-Medical): Not on file   Physical Activity:     Days of Exercise per Week: Not on file    Minutes of Exercise per Session: Not on file   Stress:     Feeling of Stress : Not on file   Social Connections:     Frequency of Communication with Friends and Family: Not on file    Frequency of Social Gatherings with Friends and Family: Not on file    Attends [employer] Services: Not on file    Active Member of [address] [place] [address] [employer] or Organizations: Not on file    Attends Club or Organization Meetings: Not on file    Marital Status: Not on file   Intimate Partner Violence:     Fear of Current or Ex-Partner: Not on file    Emotionally Abused: Not on file    Physically Abused: Not on file    Sexually Abused: Not on file   Housing Stability:     Unable to Pay for Housing in the Last Year: Not on file    Number of [place] in the Last Year: Not on file    Unstable Housing in the Last Year: Not on file       Home Meds:  Prior to Admission medications    Medication Sig Start Date End Date Taking?  Authorizing Provider   amLODIPine (NORVASC) 10 MG tablet Take 10 mg by mouth daily   Yes Historical Provider, MD   levothyroxine (SYNTHROID) 100 MCG tablet Take 100 mcg by mouth Daily   Yes Historical Provider, MD   losartan (COZAAR) 50 MG tablet Take 50 mg by mouth nightly   Yes Historical Provider, MD       Review of Systems:  Review of system cannot be obtained at this time    Current Meds:  Infusion:    fentaNYL (SUBLIMAZE) 1250 mcg in sodium chloride 0.9 % 250 mL 125 mcg/hr (01/02/22 0616)    dextrose      midazolam 7 mg/hr (01/02/22 0616)   Albert sodium chloride      propofol 50 mcg/kg/min (01/02/22 0916)    cisatracurium (NIMBEX) infusion 2.5 mcg/kg/min (01/02/22 0726)    norepinephrine Stopped (01/02/22 1050)     Meds:    dexamethasone  10 mg IntraVENous Q24H    Vitamin D  1,000 Units Oral Daily    zinc sulfate  50 mg Oral Daily    ascorbic acid  500 mg Oral Daily    insulin lispro  0-6 Units SubCUTAneous Q6H    enoxaparin  1 mg/kg SubCUTAneous Q12H    midazolam  4 mg IntraVENous Once    chlorhexidine  15 mL Mouth/Throat BID    famotidine (PEPCID) injection  20 mg IntraVENous BID    sodium chloride flush  5-40 mL IntraVENous 2 times per day    aspirin  81 mg Per NG tube Daily    atorvastatin  40 mg Per NG tube Nightly    baricitinib  4 mg Per NG tube Daily    clopidogrel  75 mg Per NG tube Daily    levothyroxine  50 mcg Per NG tube Daily    nafcillin  1,000 mg IntraVENous Q4H    polyethylene glycol  17 g Per NG tube Daily    lactobacillus  1 capsule Per NG tube Daily with breakfast    senna  5 mL Per NG tube Nightly    metoprolol tartrate  12.5 mg Oral BID    [Held by provider] losartan  25 mg Oral Daily     Meds prn: artificial tears, polyvinyl alcohol, glucose, dextrose, glucagon (rDNA), dextrose, fentanNYL, sodium chloride flush, sodium chloride, ondansetron **OR** ondansetron, acetaminophen **OR** acetaminophen, sodium chloride flush, albuterol sulfate HFA     Allergies/Intolerances: ALLERGIES: Patient has no known allergies. 24HR INTAKE/OUTPUT:      Intake/Output Summary (Last 24 hours) at 1/2/2022 1113  Last data filed at 1/2/2022 0900  Gross per 24 hour   Intake 3920.02 ml   Output 810 ml   Net 3110.02 ml     I/O last 3 completed shifts: In: 3770 [I.V.:1663.1; NG/GT:1828; IV Piggyback:278.9]  Out: 810 [Urine:810]  I/O this shift:  In: 150 [NG/GT:150]  Out: -   Admission weight: 195 lb (88.5 kg)  Wt Readings from Last 3 Encounters:   12/29/21 190 lb 0.6 oz (86.2 kg)     Body mass index is 27.27 kg/m².     Physical Examination:  VITALS:   Vitals:    01/02/22 0839 01/02/22 0900 01/02/22 1000 01/02/22 1100   BP: 111/61 118/63 112/67 113/69   Pulse:  106 112 112   Resp:  20 20 20   Temp:       TempSrc:       SpO2:       Weight:       Height:         Weight:   Wt Readings from Last 3 Encounters:   12/29/21 190 lb 0.6 oz (86.2 kg)     Limited examination secondary to COVID-19 positivity and isolation status  Constitutional and General Appearance: Intubated, sedated, ill-appearing, generalized pallor appearance  Eyes: No drainage noted  Oral: ET tube present  Neck: Multiple lines  Lungs: On mechanical ventilator  Extremities: Trace edema  GI: soft  Skin: no rash seen on exposed extremities  Musculo: No obvious joint deformities noted  Neuro: Cannot be assessed at this time  Psychiatric: Cannot be assessed at this time    Lab Data  CBC:   Recent Labs     12/31/21  0330 01/01/22  0500 01/02/22  0515   WBC 17.7* 24.4* 15.9*   HGB 13.8* 12.8* 10.4*   HCT 40.5* 37.4* 30.6*    302 191     BMP:  Recent Labs     12/31/21  0330 01/01/22  0500 01/02/22  0515   * 127* 127*   K 4.9 5.6* 4.9   CL 95* 93* 93*   CO2 25 26 27   BUN 21 25* 45*   CREATININE 0.6 0.8 1.8*   GLUCOSE 157* 156* 109*   CALCIUM 7.9* 7.8* 7.6*     Hepatic: No results for input(s): LABALBU, AST, ALT, ALB, BILITOT, ALKPHOS in the last 72 hours. Chest x-ray shows severe bilateral pulmonary opacities  Additional Labs: Urine sodium less than 20  UA small blood, 30 mg/dL protein    ABG 7.33/51/76  Echo: EF 65%    Impression and Plan:  1. Acute kidney injury likely secondary to ischemic/septic ATN in setting of COVID-19 induced diffuse inflammatory syndrome. Patient put out 800 mL of urine in last 24 hours. Will monitor urine output closely. Check kidney ultrasound. We will also send urine for eosinophils. Patient is on IV nafcillin, although acute jump of this magnitude in serum creatinine is somewhat atypical of interstitial nephritis  2.  Sepsis in setting of COVID-19 induced pneumonia. Was on Levophed  3. MSSA superimposed bacterial pneumonia. Currently on IV nafcillin  4. Mild hyponatremia. Urine sodium less than 20. Check urine osmolality. Will start low-dose isotonic saline  5. Acute hypoxic respiratory failure secondary to COVID-19 pneumonia  6. NSTEMI   7. Mild hyperkalemia:  Resolved, likely due to KIT and IV steroids  8. Medications reviewed    Thank you for the consult. Please feel free to call me if you have any questions.      Abner Cunningham MD  [facility]

## 2022-01-03 NOTE — CARE COORDINATION
1/3/22, 2:42 PM EST    DISCHARGE ON GOING EVALUATION    [facility]NA[facility] day: 13  Location: 3A-06/006-A Reason for admit: NSTEMI (non-ST elevated myocardial infarction) ([facility] [I21.4]  Acute hypoxemic respiratory failure due to COVID-19 ([facility] [U07.1, J96.01]   Procedure:   12/22 Echo with EF 65%  12/28 Intubated  12/28 CVC right subclavian  1/2 Renal US: Elevated left resistive index. Otherwise normal ultrasound of the kidneys  1/3 TESSIO left subclavian  1/3 CRRT initiated today  1/3 CXR:   1. The left double-lumen subclavian catheter terminates overlying the expected location of the cavoatrial junction. No pneumothorax   2. The endotracheal tube terminates 7.5 cm above the alyssa. The remaining life support lines and tubes are in satisfactory position. 3. There has been worsening of diffuse airspace disease. 4. Blunting of the costophrenic angles may relate to small pleural effusion or pleural thickening. 5. Otherwise, there has been no other significant interval change in the radiographic appearance of the chest [facility] 1/2/2022 1902 hours. Barriers to Discharge: Nephrology consulted yesterday for KIT. Poor UO. TESSIO placed today. CRRT initiated today. CRRT continues with net zero UF. Remains paralyzed and sedated on vent w/ETT on PCV, peep 20, PC 25, FIO2 100%, sats 75%. Tmax 99.5. 's. Respiratory culture +MSSA by PCR. Telemetry, CVC, OG w/TF @ 25 ml/hr, hare. IVF, Nimbex @ 2 mcg/kg/min, fentanyl @ 150 mcg/hr, versed @ 8 mg/hr, levo @ 36 mcg/min, diprivan @ 50 mcg/kg/min, vit C, asa, lipitor, plavix, IV decadron, lovenox bid, IV pepcid, SSI, lactobacillus, synthroid, IV nafcillin, vit D, zinc. Received 1 amp Na+ bicarb, 10 units IV insulin, 25g D50 x1, 1g Ca+ gluconate 10% x1, and 30 g kayexalate x1 today. Na+ 125, K+ 7.3, CO2 22, BUN 60, Creat 3.5, Mg 2.9, procal 1.62, CRP 18.42, triglycerides 419, wbc 26, hgb 10.1.      PCP: Elizabeth Morales  Readmission Risk Score: 15.6 ( )%  Patient Goals/Plan/Treatment Preferences: From home alone.  Plan pending clinical course.

## 2022-01-03 NOTE — FLOWSHEET NOTE
Daughter to Bedside with her . Update given on decreased oxygenation this afternoon, starting CRRT, vasopressor support, placing pt in prone position. Questions answered. Dr Vincente Brunner notified of daughter to bedside and states she will come talk with her also.

## 2022-01-03 NOTE — PROCEDURES
Central Venous Catheterization Procedure Note    Indication: KIT   [] Lack of adequate peripheral IV access  [] Infusion of medications with high risk of extravasation injury  [] Hemodynamic monitoring  [x] Hemodialysis  [] Extracorporeal therapies  [] Other:                     Time Out:  [x] Time out was completed immediately prior to the start of the procedure, which included verification of the correct patient, correct site and agreement on the procedure to be done. [] Time out was not done because procedure was emergent    Consent:  [x] The patient/surrogate decision maker was informed of the procedure indications, risks, benefits and alternatives. Questions were answered and consent was obtained to proceed with the procedure. [] Consent was not obtained, because the procedure was emergent, or the patient was unable to consent and the surrogate decision maker was not available. Type of Central Line Being Placed:   [] Central venous    [x] Hemodialysis  [] Pulmonary artery    Location:   [] Internal Jugular Vein [] Right [] Left  [x] Subclavian Vein  [] Right [x] Left  [] Femoral Vein   [] Right [] Left      Central Line Bundle:  [x] I washed/disinfected my hands prior to starting procedure  [x] Hat      [x] Mask      [x] Sterile gown      [x] Sterile gloves were worn throughout the procedure  [x] Everyone in the room during the procedure wore a mask  [x] Chlorhexidine was utilized to prep the skin and allowed to dry completely  [x] Full body drape was used to cover the patient    Ultrasound Guidance:   [x] Yes      [] No    Anesthetic Used:  [] Lidocaine      [x] Other: Fentanyl/Versed/Propofol gtt    Sterile Technique Used Throughout Procedure? [x] Yes      [] No    Description/Findings:   [x] Dark, non-pulsatile blood return obtained from all ports  [] Appropriate waveform(s) seen  [] Other    Complications:  [x] None apparent  [] Other:    EBL: less than 5 cc    Follow-up CXR: Reviewed.  Appropriate placement noted. Procedure Performed By: Chica Wallis MD PGY2    Assistant(s): If supervised: BRAEDEN Haji CNP was physically present, instructed, and supervised all key elements of this procedure. Electronically signed by Chica Wallis MD on 1/3/2022 at 11:22 AM             Was at the bedside during entire procedure. I did supervise Dr. Rachel Black with dialysis catheter placement. Electronically signed by Kasi Thurman.  Heidi Carlisle CNP on 1/3/2022 at 11:53 AM

## 2022-01-03 NOTE — FLOWSHEET NOTE
Pt sat 67%. Dr Abernathy Ours talking with daughter in family room and on speaker with other daughter. Updated on sat now. 185 pt placed in supine position. Sat 69%.      Kemar BUI at bedside. Sat 66,      blood draw from right groin per Duane Alvarado. Sat 58%. Returning blood per CRRT. Dr Abernathy Ours states family wants to continue to have pt full code. Pastoral care called for family support.  setting up for right femoral yaya placement per Duane Alvarado.  1 amp bicarb pushed,     1 amp D50 pushed. [address] [place] [address] Dr in place per Paola Beyer, CNP. Levophed  Increased to 40 mcg.  1 amp epi and 1 amp calcium gluconate pushed. Back board placed,       Epi infusion started at 5 mcg/min.  10 units to insulin IV push     Nimbex stopped and diprivan stopped per Duane Alvarado. BP 66/47 Sat 39%,  epi increased to 10 mcg/min.  epi increased to 15 mcg     epi increased to 20 mcg     epi increased to 30 mcg      HR 32 1 amp atropine pushed.  no pulse, no waveform on yaya, CPR started. 1 amp epi given     1 amp bicarb given     weak pulse present momentarily then no pulse again CPR started      1 amp epi given,     compressors changed, no pulse.  no pulse, no heart tones auscultated, no breaths noted, CODE called per Paola Beyer with pt .

## 2022-01-03 NOTE — PROGRESS NOTES
Epoprostenol Initial Assessment    Patient on heated humidification via ventilator. Pre-assessment parameters were obtained before the start of Epoprostenol. Initial Assessment Hemodynamic Measurement if available   Heart Rate 120 beats per minute Mean Pulmonary Artery Pressure  62 mm Hg   Blood Pressure 99/50 mm Hg Cardiac Output  N/A l/min   PaO2/ FiO2 ratio 48 ratio Cardiac Index N/A l/min/m2    AutoPeep 0cmH2O Central Venous Pressure N/A mm Hg     Patient's ideal body weight was calculated to be 73kg. Calculated rate of administration is 7.3 ml/hour. Epoprostenol administration initiated via Alaris syringe pump. Rate of medication verified by Marzena Lees RRT and [address] [place] [address] RRT . Medication syringe and tubing are protected from light. Aerogen nebulizer is plugged into an electrical outlet and on continuous mode. Nebulizer functioning properly. Extra Solo nebulizer at bedside for back-up. Two filters placed on the exhalation side of the ventilator circuit. Parameters were re- assessed in 30 minutes. 30 Minute Assessment Hemodynamic Measurement if available   Heart Rate *** beats per minute Mean Pulmonary Artery Pressure  {na/text:371894606} mm Hg   Blood Pressure ***/*** mm Hg Cardiac Output  {na/text:301622203} l/min   PaO2/ FiO2 ratio *** ratio Cardiac Index {na/text:357015160} l/min/m2    AutoPeep *** cmH2O Central Venous Pressure {na/text:879545383} mm Hg       4 hour  Assessment Hemodynamic Measurement if available   Heart Rate *** beats per minute Mean Pulmonary Artery Pressure  {na/text:760307361} mm Hg   Blood Pressure ***/*** mm Hg Cardiac Output  {na/text:843182377} l/min   PaO2/ FiO2 ratio *** ratio Cardiac Index {na/text:540581906} l/min/m2    AutoPeep *** cmH2O Central Venous Pressure (CVP) {na/text:532183937} mm Hg       Did patient have any clinically meaningful response to medication?   Yes No N/A  []  []  []  10% improvement in PaO2/FiO2 ratio within 4 hours of therapy   []  [] [] 15% decrease in mean pulmonary artery pressure or CVP  []  [] [] 15% increase in cardiac output or index    Physician {WAS/WAS NOT:[id number]::\"was not\"}notified of assessment. Plan is to {Continue Discontinue:25436163} with treatment. Notify pharmacy for new syringe on Date: *** Time: ***, 2 hour prior to medication running out or room air expiration. Syringe will need changed on Date: *** Time: ***    Aerogen Solo Nebulizer will need changed on Date: *** Time: *** , 7 days after initiation or last change. Epoprostenol tubing will be changed on Date: *** Time: *** , 96 hours after initiation or last change.

## 2022-01-03 NOTE — FLOWSHEET NOTE
Pt placed in prone position due to decreased saturation,     [id number], Resp therapist updated Jovan Briones CNP regarding sats in 70's with adjustment of vent settings and place in prone position. Shannock states she will discuss case with Dr Stephen Salinas. [place] [id number] therapist has adjust vent settings. Dr Ricardo Zuniga updated up Perfect serve regarding currently prone, sats 76%, current vent settings of 34/13 @ 100%,     1535 Dr Ricardo Zuniga states she will be at bedside \"ASAP\"    0450--5166 Dr Stephen Salinas and Dr Ricardo Zuniga at bedside. Update given. Pt status update given. Dr Stephen Salinas adjusting vent settings. Finalizing at 20/10 @ 100%. Plan to start veletri. And Dr Ricardo Zuniga to update family. Harry 89 Dr Stephen Salinas to bedside to check patient. Pharmacy notified to send Veletri stat.

## 2022-01-03 NOTE — PROGRESS NOTES
CRITICAL CARE NOTE        Patient:  Bozena Mason    Unit/Bed:3A-06/006-A  MRN: [id number]   PCP: Elizabeth Morales  Date of Admission: 12/21/2021    Assessment and Plan(All pulmonary edema, renal failure, PE, and respiratory failure diagnoses are acute in nature unless otherwise specified):          1. Acute hypoxic respiratory failure: due to COVID with superimposed bacterial infection, complicated by ARDS. S/p intubation 12/28/2021. On lung protective measures. Undergoing paralytic therapy with Nimbex. On Fentanyl, Propofol, and Versed for sedation. Pronation therapy deferred at this time as he did not tolerate it well last night. 2. COVID-19: Unvaccinated. Symptoms started 12/7/2021. COVID-19 PCR positive on 12/15/2021. Initially on HFNC requiring emergent intubation. On Decadron 10 mg IV daily. Barcitinib 2 mg given. 3. Severe ARDS: Initial PF ratio 55, worsening. Continue management as above. 4. MSSA pneumonia: PCR and Resp Cx 12/28 positive. On Nafcillin. May need repeat cultures if spikes fevers. 5. KIT: due to COVID-19 nephropathy and septic shock. Declining UOP overnight with associated severe hyperkalemia and worsening Cr 3.5. Insulin/Bicarb/calcium gluc/kayexalate ordered. Nephrology recommending possible CRRT today. Plan for Tessio placement today. Verbal consent obtained from daughter, Kike Bonilla via telephone. 6. Severe hyperkalemia: due to acidosis. ABG 6.96/116/55/26. Management as above. Repeat labs in 3 hours post treatment. 7. Moderate hypontaremia: due to KIT. 8. NSTEMI: initially complained of chest pain prior to transfer with associated elevated Troponin. Cardiac cath deferred per Cardiology due to critical illness until clinical improvement is noted. ECHO 12/22 with EF 65%. 9. Small left pneumothorax: <5% noted on CXR earlier. Resolved.            CC: chest pain  HPI: 45 yo M with history of HTN, HLD, Hypothyroidism admitted to [facility] for acute hypoxic respiratory failure due to COVID-19 on 12/15/2021. Reported complaints of chest pain with associated elevated troponin, c/f NSTEMI 12/20/2021, started on Nitro gtt. Transferred to [address] [place] [address] to consider Cath for NSTEMI on 12/21/2021 per Cardiology recommendations. Cardiac Cath deferred due to acute respiratory failure until clinical improvement. Required emergent intubation 12/28/2021 due to tachypnea and worsening hypoxia on max BiPAP. Course complicated by ARDS with MSSA pneumonia, on Nafcillin as well as left sided small pneumothorax. ROS: unable to obtain  PMH: Per HPI    Subjective (events over the past 24 hours):   Patient noted to be hypoxic on pronation. Hypoxia improved to SpO2 90% after an hour of supination. Pronation therapy deferred for now. UOP 25 cc overnight. Morning labs revealed severe hyperkalemia 7.3 with moderate hyponatremia, BUN 60, Cr 3.5 from 1.8 yesterday. ABG 6.96/116/55/26. Nephrology notified.  Insulin/d50/bicarb/kayexalate ordered per Dr. Albert Avitia.    Objective:  Vitals:   Temp: 98.8 °F (37.1 °C)  Pulse: 110  Resp: 20  BP: 112/63  FiO2 : 100 %  SpO2: (!) 87 %  Kemp Coma Scale  Eye Opening: None  Best Verbal Response: None  Best Motor Response: None  Kemp Coma Scale Score: 3  OPO Notified: Not indicated    MAP: 69    Ventilator:  Vent Information  Vent Mode: PCV+  Rate Set: 20 bmp  FiO2 : 100 %  PEEP/CPAP: 18  Pressure Ordered: 36 (pc of 18)  End Tidal CO2: 58 (%)     Vent Patient Data  Peak Inspiratory Pressure: 44 cmH2O  Mean Airway Pressure: 24 cmH20  Rate Measured: 20 br/min  Vt Exhaled: 387 mL  Minute Volume: 8 Liters  I:E Ratio: 1:2.8  Plateau Pressure: 33 cmH20  Static Compliance: 37 mL/cmH2O      Input/Output:  In: 2037.6   Out: 100 [Urine:100]    Intake/Output Summary (Last 24 hours) at 1/3/2022 0703  Last data filed at 1/3/2022 0536  Gross per 24 hour   Intake 3280.7 ml   Output 450 ml   Net 2830.7 ml   UOP: 0.2 cc/kg/hr, total 25 cc overnight  Net UOP since admission: +4.8    Medications:     sodium chloride 50 mL/hr at 01/03/22 0536    fentaNYL (SUBLIMAZE) 1250 mcg in sodium chloride 0.9 % 250 mL 150 mcg/hr (01/03/22 0604)    dextrose      midazolam 8 mg/hr (01/03/22 0536)    sodium chloride      propofol 49.958 mcg/kg/min (01/03/22 0536)    cisatracurium (NIMBEX) infusion 2.5 mcg/kg/min (01/03/22 0536)    norepinephrine 14 mcg/min (01/03/22 0658)      dextrose  25 g IntraVENous Once    insulin regular  10 Units IntraVENous Once    sodium bicarbonate  50 mEq IntraVENous Once    calcium gluconate  1,000 mg IntraVENous Once    sodium polystyrene  30 g Per G Tube Once    baricitinib  2 mg Per NG tube Daily    dexamethasone  10 mg IntraVENous Q24H    Vitamin D  1,000 Units Oral Daily    zinc sulfate  50 mg Oral Daily    ascorbic acid  500 mg Oral Daily    insulin lispro  0-6 Units SubCUTAneous Q6H    enoxaparin  1 mg/kg SubCUTAneous Q12H    midazolam  4 mg IntraVENous Once    chlorhexidine  15 mL Mouth/Throat BID    famotidine (PEPCID) injection  20 mg IntraVENous BID    sodium chloride flush  5-40 mL IntraVENous 2 times per day    aspirin  81 mg Per NG tube Daily    atorvastatin  40 mg Per NG tube Nightly    clopidogrel  75 mg Per NG tube Daily    levothyroxine  50 mcg Per NG tube Daily    nafcillin  1,000 mg IntraVENous Q4H    polyethylene glycol  17 g Per NG tube Daily    lactobacillus  1 capsule Per NG tube Daily with breakfast    senna  5 mL Per NG tube Nightly    metoprolol tartrate  12.5 mg Oral BID    [Held by provider] losartan  25 mg Oral Daily       BMI:  Body mass index is 27.27 kg/m². Physical Exam:  General:  Critically ill, intubate sedated, paralyzed  HEENT:  normocephalic and atraumatic. Neck: supple. No Thyromegaly. Lungs: Diminished lung sounds. No retractions  Cardiac: RRR. No murmurs/gallops. Abdomen: soft. Nontender. Extremities:  No clubbing, cyanosis, or edema x 4. Vasculature: capillary refill < 3 seconds. Palpable dorsalis pedis pulses. Skin:  warm and dry. Psych: Intubated/Sedated  Lymph:  No supraclavicular adenopathy. Neurologic:  No focal deficit. No seizures. Data: (All radiographs, tracings, PFTs, and imaging are personally viewed and interpreted unless otherwise noted).  Na 125, K 7.3, Cl 88, CO2 22, BUN 60, Cr 3.5      CXR 1/2 with bilateral infiltrates      FEN/GI/DVT  Electrolytes: Monitor and replace per protocols  GI PPX: On H2 blocker for GERD  DVT Prophylaxis: No prophylaxis/Already on anticoagulation: Lovenox  Diet NPO  ADULT TUBE FEEDING; Orogastric; Peptide Based; Continuous; 10; Yes; 10; Q 12 hours; 45; 30; Q 4 hours; Protein; 2.5 oz. Proteinex daily    Meets Continued ICU Level Care Criteria:    [x] Yes   [] No - Transfer Planned to listed location:    Case and plan discussed with . Danica Chester MD  PGY2, Internal Medicine         Patient seen by me. Case discussed with resident physician. Patient unable to adequately oxygenate. Increasing PEEP only causes further decline in oxygenation. Decreasing PEEP and pressure control improves oxygenation but only to 77%. Patient with ongoing persistent recalcitrant hypoxemia. Patient on maximal medical support. I do not expect the patient to survive. Italicized font represents changes to the note made by me. CC time 35 minutes. Time was discontiguous. Time does not include procedures. Time does include my direct assessment of the patient and coordination of care.   Electronically signed by Elizabeth Valenzuela MD on 1/3/2022 at 7:02 PM

## 2022-01-03 NOTE — PROGRESS NOTES
[facility]   Nephrology progress note  1/3/2022, 11:26 AM      Pt Name:    Ora Britt  MRN:     [id number]     YOB: 1970  Admit Date:    12/21/2021  5:51 PM  Primary Care Physician:  Zeus Hernandez   Room number  3A-06/006-A    Chief Complaint: Nephrology following for KIT    Subjective:  Patient seen and examined  Seen earlier today during rounds  Potassium 7.3  Poor urine output  100% FiO2  On Levophed      Objective:  24HR INTAKE/OUTPUT:    Intake/Output Summary (Last 24 hours) at 1/3/2022 1126  Last data filed at 1/3/2022 0536  Gross per 24 hour   Intake 3130.7 ml   Output 450 ml   Net 2680.7 ml     I/O last 3 completed shifts: In: 3280.7 [I.V.:2194.8; NG/GT:780; IV Piggyback:305.9]  Out: 450 [Urine:450]  No intake/output data recorded. Admission weight: 195 lb (88.5 kg)  Wt Readings from Last 3 Encounters:   12/29/21 190 lb 0.6 oz (86.2 kg)     Body mass index is 27.27 kg/m². Physical examination  VITALS:     Vitals:    01/03/22 1015 01/03/22 1050 01/03/22 1055 01/03/22 1115   BP: (!) 96/52 (!) 104/51 (!) 104/59    Pulse: 109 111 112 110   Resp: 22 20 20 20   Temp:       TempSrc:       SpO2:    (!) 85%   Weight:       Height:         Limited examination secondary to COVID-19 positivity and isolation status  General Appearance: Ill-appearing, intubated  Mouth/Throat: ET tube present  Neck: No JVD noted  Lungs:  On mechanical ventilator  GI: soft  Extremities: No significant edema      Lab Data  CBC:   Recent Labs     01/01/22  0500 01/02/22  0515 01/03/22  0500   WBC 24.4* 15.9* 26.0*   HGB 12.8* 10.4* 10.1*   HCT 37.4* 30.6* 31.1*    191 285     BMP:  Recent Labs     01/01/22  0500 01/02/22  0515 01/03/22  0500   * 127* 125*   K 5.6* 4.9 7.3*   CL 93* 93* 88*   CO2 26 27 22*   BUN 25* 45* 60*   CREATININE 0.8 1.8* 3.5*   GLUCOSE 156* 109* 167*   CALCIUM 7.8* 7.6* 7.8*   MG  --   --  2.9*     Hepatic: No results for input(s): LABALBU, AST, ALT, ALB, BILITOT, ALKPHOS in the last 72 hours. Meds:  Infusion:    prismaSATE BGK 4/2.5      prismaSol BGK 2/3.5      prismaSol BGK 2/3.5      sodium chloride 50 mL/hr at 01/03/22 0536    fentaNYL (SUBLIMAZE) 1250 mcg in sodium chloride 0.9 % 250 mL 150 mcg/hr (01/03/22 0604)    dextrose      midazolam 8 mg/hr (01/03/22 0536)    sodium chloride      propofol 50 mcg/kg/min (01/03/22 0941)    cisatracurium (NIMBEX) infusion 2.5 mcg/kg/min (01/03/22 0536)    norepinephrine 14 mcg/min (01/03/22 0658)     Meds:    insulin lispro  0-18 Units SubCUTAneous TID WC    insulin lispro  0-9 Units SubCUTAneous Nightly    dexamethasone  10 mg IntraVENous Q24H    Vitamin D  1,000 Units Oral Daily    zinc sulfate  50 mg Oral Daily    ascorbic acid  500 mg Oral Daily    enoxaparin  1 mg/kg SubCUTAneous Q12H    midazolam  4 mg IntraVENous Once    chlorhexidine  15 mL Mouth/Throat BID    famotidine (PEPCID) injection  20 mg IntraVENous BID    sodium chloride flush  5-40 mL IntraVENous 2 times per day    aspirin  81 mg Per NG tube Daily    atorvastatin  40 mg Per NG tube Nightly    clopidogrel  75 mg Per NG tube Daily    levothyroxine  50 mcg Per NG tube Daily    nafcillin  1,000 mg IntraVENous Q4H    polyethylene glycol  17 g Per NG tube Daily    lactobacillus  1 capsule Per NG tube Daily with breakfast    senna  5 mL Per NG tube Nightly    [Held by provider] metoprolol tartrate  12.5 mg Oral BID    [Held by provider] losartan  25 mg Oral Daily     Meds prn: artificial tears, polyvinyl alcohol, glucose, dextrose, glucagon (rDNA), dextrose, fentanNYL, sodium chloride flush, sodium chloride, ondansetron **OR** ondansetron, acetaminophen **OR** acetaminophen, sodium chloride flush, albuterol sulfate HFA       Impression and Plan:  1. Severe acute kidney injury. Essentially anuric at this time. In setting of septic shock as well as profound respiratory acidemia.   Likely has acute vasomotor constriction in setting of acute hypercarbic hypoxic respiratory failure  Not making urine at this time. Potassium is 7.3. Was medically treated for hyperkalemia this morning. Have requested ICU to place temporary dialysis catheter. Will  need CVVH  2. Critical hyperkalemia. Medical management initiated this morning. Will repeat potassium. Need CVVH  3. Acute hypercarbic respiratory acidosis  4. Hyponatremia  5. MSSA pneumonia  6. Acute hypoxic respiratory failure secondary to COVID-19 pneumonia  7. Recent NSTEMI  8. Medications reviewed  9.   Discussed with ICU      Dilip Rowe MD  [facility]

## 2022-01-04 NOTE — DISCHARGE SUMMARY
[facility] Critical Care Discharge Summary    Patient ID   Lorie Hernandez   1970  [id number]    Primary Care:   Martin Burgess    Admit date: 12/21/2021   Discharge date: 1/3/2022 TOD 1942    Preliminary cause of death: Multisystem organ failure secondary to Covid-19, acute respiratory failure, hypoxia, severe ARDS, severe Covid-19, acute kidney injury. Admitting Physician: Sarmad Garcia MD   Discharge Physician: NELLIE Giles DNP, APRN-CNP     Consultants: nephrology    Invasive procedures: 12/28-oral intubation, 12/28-CVC placement, 1/3 temporary dialysis catheter placement, 1/3 arterial line    Discharge Diagnoses:   Acute respiratory failure, hypoxia  Severe Covid-19  ARDS-severe  Multisystem organ failure secondary to Covid-19  Acute kidney injury  Superimposed bacterial pneumonia-MSSA  Severe hyperkalemia  Hyponatremia-moderate  Non-STEMI  Small left less than 5% pneumothorax  Acute hypercapnic respiratory acidosis  Impaired glucose tolerance  Anemia, macrocytic  Essential hypertension-history  Dyslipidemia-history  Nephrolithiasis-history  IBS-history  Hypothyroidism-history     Patient Active Problem List   Diagnosis Code    NSTEMI (non-ST elevated myocardial infarction) ([facility] [address] I21.4    Acute hypoxemic respiratory failure due to COVID-19 ([facility] [address] U07.1, J96.01         Brief history, reason for admission: Lorie Hernandez is a 46 y.o. male transferred as a direct admit to [address] [place] [address] on 12/21/2021 from [facility] after he was hospitalized over there on 12/15/2021 with acute hypoxemic respiratory failure secondary to COVID-19 pneumonia then developed an NSTEMI on 12/21/2021 with acute pressure-like chest pain. Patient was transferred upon the recommendations of cardiology    Hospital Course:  Onset of symptoms 12/7/2021 consisting of fatigue, decreased energy, decreased appetite, weakness, and high fevers of 102.6 °F. He  presented to [facility] on 12/15/2021 which was the date when his COVID-19 PCR was found to be positive. Patient is unvaccinated against COVID-19. He was found to be hypoxemic down to 84% on room air. CT angiogram of the chest on admission showed multifocal pneumonia without evidence of PE. Patient received Decadron and Remdesivir.     Patient unfortunately continued to progressively worsen requiring more oxygen support reaching high settings on BiPAP. On the evening of 12/20/2021 patient had some rhythm changes on telemetry and troponin was found to be elevated. Patient was asymptomatic with no complaint of chest pain. Patient was initiated on IV nitroglycerin drip as well as heparin drip and cardiology were consulted. This morning 12/21 patient reported mild tightness in his chest while on BiPAP and an initial EKG only showed some peaked T waves and no acute ischemic or ST segment changes.      Cardiology Dr. Kandace De Los Santos evaluated patient in consultation today 12/21 and stated that patient will likely need cardiac catheterization once more stable and recommended starting the patient on aggressive guideline directed optimal medical therapy including aspirin and beta-blocker, nitrates, Plavix and statins. EKG was obtained showing questionable mild ST segment changes concerning for possible ST elevation in the inferior leads. Patient was then emergently transferred by EMS to our facility as a code STEMI. However, code STEMI was canceled in route by cardiologist.     At the time of admission at [facility] patient was found on Bipap and complaint of anxiety. 12/28/2021 persistent hypoxia and tachypnea patient was transferred to 3A COVID-ICU for emergent intubation. Patient did require pronation therapy shortly after intubation for hypoxia. Course complicated by ARDS with MSSA pneumonia, on Nafcillin as well as left sided small pneumothorax. <5%. 1/2/2021 Nephrology consulted for acute kidney injury and hyperkalemia. 1/3/2021 CRRT was initiated.   Unfortunately persistent hyperkalemia and respiratory acidosis continued. Patient was on high dose Levophed drip and epinephrine drip. A CODE BLUE was called with bradycardia-PEA arrest.  ROSC was not obtained. TOD . Significant Diagnostic Studies:   1/3/2021 sodium 129 ionized calcium is 1.09 potassium 7.0 creatinine 4.1  Lactic acid 2.8  Glucose 178  Chloride 97  Triglycerides 419  White count 26.0 hemoglobin 10.1 hematocrit 31.1 platelet count 165  3256 ABG pH 6.92 PCO2 137 PO2 34 bicarb 28     Renal US-elevated left resistive index. 2021 ECHO LVEF 65%. No regional wall motion abnormality. Discharged Condition: terminal  Disposition:         CAnya Giles DNP, APRN-CNP  1/3/2022    Time spent on discharge 35 minutes

## 2022-01-04 NOTE — PROGRESS NOTES
[address] [place] [address]  Notice of Patient Passing      Patient Name- Mariia Buck Number- [de-identified]   Attending Physician- Denton Headley MD    Admitted on-12/21/2021  5:51 PM     On 1/3/2022 at 1942 patient was found in 3a06 with:   Absence of vital signs. Absence of neurological response. Confirmed time of death at 200. Physician or On-call Physician notified of time of death- yes    Family present at time of death- yes   Spiritual care present at time of death- yes    Physician was notified and orders were obtained to release the body. Post-Mortem documentation completed; form printed, signed, and given to admitting.     Forrest Bingham, RN RN Nursing Supervisor/ Manager  1/3/22   8:54 PM

## 2022-01-04 NOTE — FLOWSHEET NOTE
[place] [address] PROGRESS NOTE      Patient: Nikki Ortiz  Room #: 3A-06/006-A            YOB: 1970  Age: 46 y.o. Gender: male            Admit Date & Time: 12/21/2021  5:51 PM    Assessment:   was called to provide support for pt's family as pt's condition was rapidly declining. Originally one of the pt's daughter's and a son-in-law were present. Eventually the pt's parents, and other two children and their spouses arrived. Family expressed feelings of denial and shock. Interventions:   provided a listening and supportive presence.  prayed with the family and shared scripture.  helped family with donning and doffing. Outcomes:  Family expressed gratitude for the support. Plan:  1. Provides spiritual care and support as needed. 2.  Follow up with grief packet, condolence card and phone call. Electronically signed by Vonnie Gross on 1/3/2022 at 9:07 PM.  [address] [facility]  [phone]       01/03/22 1915   Encounter Summary   Services provided to: Family   Referral/Consult From: Nurse   Support System Parent; Children;Family members   Continue Visiting Yes  (1/3)   Complexity of Encounter High   Length of Encounter 2 hours   Spiritual Assessment Completed Yes   Grief and Life Adjustment   Type Death   Assessment Tearful;Grieving;Denial;Shock   Intervention Active listening;Explored feelings, thoughts, concerns;Explored coping resources;Prayer;Scripture;Sustaining presence/ Ministry of presence; Discussed afterlife   Outcome Connection/belonging;Comfort;Engaged in conversation;Expressed feelings/needs/concerns;Grieving

## 2022-01-04 NOTE — FLOWSHEET NOTE
1300 Entered room with pt having sats in the mid 80's. Dr Nevaeh De Los Santos on unit and aware. Vent settings currently 34/14 with FiO2 of 100%. Right AC IV is leaking, removed and oozing. Quick Clot applied. Assessment completed. 1345 pt with stool. Pt turned and cleaned. Pt with decrease in sats after turning. Pt set up in high fox. 1400 Sats continue decreased. Resp therapist call to bedside.

## 2022-01-04 NOTE — PROCEDURES
Patient Name: Azucena Singh   Medical Record Number: [id number]  Date: 1/3/2022   Time: 7:48 PM   Room/Bed: 3A-06/006-A  Arterial Line Placement Procedure Note                     Indication: Need for serial blood work, metabolic derangement, arterial blood gases, mechanical ventilation, severe hypotension, cardiovascular instability and shock    Prasad's Test: Not indicated in this particular procedure    Procedure: The skin over the right femoral artery was prepped with Chloraprep. Under ultrasound guidance a 4 Albanian arterial line catheter was then inserted, using a modified Seldinger technique, into the vessel. The transducer set was then attached and securely fastened to the skin without sutures. Waveforms on the monitor were observed and found to be adequate. The patient had good distal perfusion after the procedure. The site was then dressed in a sterile fashion. The patient tolerated the procedure well. Complications: None    Electronically Signed by: NELLIE Giles DNP, APRN-CNP

## 2022-01-04 NOTE — CODE DOCUMENTATION
Kevin Ferrell is a 77-year-old  male admitted 12/21/2021 with acute respiratory failure, non-STEMI under the hospitalist service. 12/28/2021 patient was emergently intubated with acute respiratory distress. Course complicated by ARDS with MSSA pneumonia, on Nafcillin as well as left sided small pneumothorax. <5%. 1/3/2022 patient suffered severe hypoxia with difficulty maintaining SaO2 greater than 80%. Patient was repositioned to supine position noted SaO2 to be 66 to 70%. Patient was tachycardic and hypotensive. Epinephrine drip was started. CRRT was stopped. Patient became bradycardic with loss of pulse. CODE BLUE was called. Please see nursing documentation. CPR was initiated. Multiple rounds of epinephrine and bicarb were given. Code was stopped rhythm was asystole. TOD was 1942.

## 2022-01-06 NOTE — ADT AUTH CERT
Myocardial Infarction - Care Day 13 (1/2/2022) by Marga Justin RN       Review Status Review Entered   Completed 1/5/2022 15:24      Criteria Review      Care Day: 13 Care Date: 1/2/2022 Level of Care: ICU    Guideline Day 2    Level Of Care    ( ) Intermediate care or telemetry    1/5/2022 3:24 PM EST by Karyn Saravia      ICU    Clinical Status    ( ) * Hemodynamic stability    1/5/2022 3:24 PM EST by Karyn Saravia      99.1, 116, 23, 106/61    (X) * Chest pain, dyspnea, or anginal equivalent absent    1/5/2022 3:24 PM EST by Karyn Saravia      no issues noted    Routes    ( ) * Oral hydration    1/5/2022 3:24 PM EST by Karyn Saravia      NPO    ( ) * Oral medications    1/5/2022 3:24 PM EST by Karyn Saravia      NPO    (X) Parenteral medications    1/5/2022 3:24 PM EST by Sage Arteaga noted    Interventions    ( ) * Oxygen absent    1/5/2022 3:24 PM EST by Karyn Saravia      vent    Medications    (X) * IV nitroglycerin absent    1/5/2022 3:24 PM EST by Karyn Saravia      not noted    (X) Anticoagulants    1/5/2022 3:24 PM EST by Karyn Saravia      lovenox    (X) Antiplatelet agents (eg, aspirin, clopidogrel, ticagrelor)    1/5/2022 3:24 PM EST by Karyn Saravia      plavix    (X) Beta-blocker    1/5/2022 3:24 PM EST by Sage Arteaga lopressor    (X) Statin    1/5/2022 3:24 PM EST by Karyn Saravia      lipitor    * Milestone   Additional Notes   1/2/22       VS- temp 99.1, , RR 23, /61, O2 87%       O2- vent          Labs- Na 127, Cl 93, BUN 45, Creat 1.8, Anion 7, GFR 40, Glucose 115, Ca 7.6, WBC 15.9, Hgb 10.4,         UA- large blood, protein 100, trace leukcoytes, turbid       BGs- pH 7.33, pCO2 51, pO2 76, HCO3 27, O2 94       CXR-    Persistent moderate severity patchy bilateral pulmonary opacities.       US renal-    Elevated left resistive index.  Otherwise normal ultrasound of the kidneys.           Intensivist-   Acute hypoxic respiratory failure: Requiring intubation after failing high flow nasal cannula and BiPAP on 12/28.  Maintain peak pressures less than 35. COVID-19: Positive on 12/15/2021.  Status post remdesivir at outlying facility.  Continue baricitinib and Decadron therapy. Severe ARDS: PF ratio 76.  Continue pronation therapy. Bacterial pneumonia: Positive for MSSA.  Nafcillin day #6   NSTEMI: Cardiology recommendations to wait for cardiac cath until clinically more stable.  Patient was started on aspirin Plavix and full dose Lovenox.  Status post nitro drip.  Echocardiogram within normal limits.  12.5 mg of Lopressor if tolerates. 3   KIT: renal ultrasound pending, urine lites pending, nephrology consulted   Hypotension: Likely secondary to sedation.  No other signs of infection besides pneumonia which is being treated.  Monitor. Left pneumothorax: Chest x-ray reveals less than 5% left-sided apical pneumothorax.  Monitor.  Repeat chest x-ray reports tiny pneumothorax.   I did explain to nurse to call with any signs of respiratory or hemodynamic compromise. Mild hyponatremia: mild, monitor.  Serum osmolality 291.  Cortisol level 21.36, patient appears euvolemic. Urine output decreased, nephrology consulted.    Mild hyperkalemia: Mild, monitor    Impaired glucose tolerance: Added low-dose sliding scale insulin.  Likely secondary to steroid use.  A1c 5.8   Leukocytosis: Patient has MSSA.  Continue antibiotic therapy.   Continue to monitor.               Nephrology-   Impression and Plan:   Acute kidney injury likely secondary to ischemic/septic ATN in setting of COVID-19 induced diffuse inflammatory syndrome.  Patient put out 800 mL of urine in last 24 hours.  Will monitor urine output closely.  Check kidney ultrasound.  We will also send urine for eosinophils.  Patient is on IV nafcillin, although acute jump of this magnitude in serum creatinine is somewhat atypical of interstitial nephritis   Sepsis in setting of COVID-19 induced pneumonia.  Was on Levophed   MSSA superimposed bacterial pneumonia.  Currently on IV nafcillin   Mild hyponatremia.  Urine sodium less than 20.  Check urine osmolality.  Will start low-dose isotonic saline   Acute hypoxic respiratory failure secondary to COVID-19 pneumonia   NSTEMI   Mild hyperkalemia: Resolved, likely due to KIT and IV steroids   Medications reviewed              Summary- Patient in ICU          Patient received Vitamin C 500mg PO x1, ASA 81mg PO x1, Lipitor 40mg PO x1, Olumiant 4mg PO x1, Plavix 75mg PO x1, Dexamethasone 10mg IV x1, Lovenox 80mg SC x2, Pepcid 20mg IV x2, Humalog SC QID SS, Culturelle PO x1, Synthroid 50mcg PO x1, Lopressor 12.5mg PO x1, Versed IV Gtt, Nafcilling IV Gtt, Glycolax 17g PO x1, Senokot 8.8mg PO x1, Lokelma 10g PO x1, Vitamin D 1000units PO x1, Zincate 50mg PO x1, NS IV Gtt @ 50ml/hr, Nimbex IV Gtt, Fentanyl IV Gtt, Versed IV Gtt, Levophed IV Gtt, Propofol IV Gtt, Tylenol 650mg PO x2,             Myocardial Infarction - Care Day 11 (12/31/2021) by Elkin Horvath RN       Review Status Review Entered   Completed 1/5/2022 15:15      Criteria Review      Care Day: 11 Care Date: 12/31/2021 Level of Care: ICU    Guideline Day 2    Level Of Care    ( ) Intermediate care or telemetry    1/5/2022 3:15 PM EST by [employer]      ICU    Clinical Status    ( ) * Hemodynamic stability    1/5/2022 3:15 PM EST by [employer]      100.9, 114, 20, 93/62    (X) * Chest pain, dyspnea, or anginal equivalent absent    1/5/2022 3:15 PM EST by [employer]      no issues noted    Routes    ( ) * Oral hydration    1/5/2022 3:15 PM EST by [employer]      NPO    ( ) * Oral medications    1/5/2022 3:15 PM EST by [employer]      NPO    (X) Parenteral medications    1/5/2022 3:15 PM EST by [employer]      noted    Interventions    ( ) * Oxygen absent    1/5/2022 3:15 PM EST by [employer]      vent    Medications    (X) * IV nitroglycerin absent    1/5/2022 3:15 PM EST by Chantel Hastings      none noted    (X) Anticoagulants    1/5/2022 3:15 PM EST by Chantel Hastings      lovenox    (X) Antiplatelet agents (eg, aspirin, clopidogrel, ticagrelor)    1/5/2022 3:15 PM EST by Chantel Hastings      plavix    (X) Beta-blocker    1/5/2022 3:15 PM EST by Lorelei Erickson lopressor    (X) Statin    1/5/2022 3:15 PM EST by Chantel Hastings      lipitor    * Milestone   Additional Notes   12/31/21       VS- temp 100.9, , RR 20, BP 93/62, O2 93%       O2- vent          Labs- Na 130, Cl 95, Glucose 157, Ca 7.9, WBC 17.7, Hgb 13.8,       CXR-   1. Unchanged position of support lines and tubes. 2. Peripheral predominant interstitial densities and airspace disease   throughout the bilateral lungs which has worsened/progressed from the   prior examination. Tiny left apical pneumothorax grossly unchanged.       Intensivist-   Acute hypoxic respiratory failure: Requiring intubation after failing high flow nasal cannula and BiPAP on 12/28.  Maintain peak pressures less than 35. COVID-19: Positive on 12/15/2021.  Status post remdesivir at outlying facility.  Continue baricitinib and Decadron therapy. Severe ARDS: PF ratio 63.  Continue pronation therapy. Bacterial pneumonia: Positive for MSSA.  Nafcillin day #4   NSTEMI: Cardiology recommendations to wait for cardiac cath until clinically more stable.  Patient was started on aspirin Plavix and full dose Lovenox.  Status post nitro drip.  Echocardiogram within normal limits.  12.5 mg of Lopressor if tolerates. Hypotension: Likely secondary to sedation.  No other signs of infection besides pneumonia which is being treated.  Monitor. Left pneumothorax: Chest x-ray reveals less than 5% left-sided apical pneumothorax.  Monitor.  Repeat chest x-ray reports tiny pneumothorax.  I did explain to nurse to call with any signs of respiratory or hemodynamic compromise.    Mild hyponatremia: mild, monitor.  Serum osmolality 291. Cortisol level pending, patient appears euvolemic.    Mild hyperkalemia: Mild, monitor    Impaired glucose tolerance: Added low-dose sliding scale insulin.  Likely secondary to steroid use.  A1c 5.8   Leukocytosis: Patient has MSSA.  Continue antibiotic therapy.   Continue to monitor.             Summary- Patient in ICU          Patient received Vitamin C 500mg PO x1, ASA 81mg PO x1, Liptior 40mg PO x1, Olumiant 4mg PO x1, Plavix 75mg PO x1, Lovenox 80mg SC x2, Pepcid 20mg IV x2, Fentanyl IV Gtt, Humalo SC QID SS, Culturelle PO x1, Synthroid 50mcg PO x1, Lopressor 12.5mg PO x1, Lopressor 2.5mg PO x1, Versed IV Gtt, Nafcillin IV Gtt, Glycolax 17g PO x1, Senokot 8.8mg PO x1, Vitamin D 1000units PO x1, Zincate 50mg PO x1, Nimbex IV Gtt, Levophed IV Gtt, Propofol IV Gtt, Tylenol 650mg PO x2,

## 2022-10-04 NOTE — PROGRESS NOTES
Primary RN to clarify PICC line insertion order with provider. To contact IV therapy department as needed. Consent 1/Introductory Paragraph: The rationale for Mohs was explained to the patient and consent was obtained. The risks, benefits and alternatives to therapy were discussed in detail. Specifically, the risks of infection, scarring, bleeding, prolonged wound healing, incomplete removal, allergy to anesthesia, nerve injury and recurrence were addressed. Prior to the procedure, the treatment site was clearly identified and confirmed by the patient. All components of Universal Protocol/PAUSE Rule completed.